# Patient Record
Sex: FEMALE | Race: WHITE | Employment: STUDENT | URBAN - METROPOLITAN AREA
[De-identification: names, ages, dates, MRNs, and addresses within clinical notes are randomized per-mention and may not be internally consistent; named-entity substitution may affect disease eponyms.]

---
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
## 2023-03-09 ENCOUNTER — TELEPHONE (OUTPATIENT)
Dept: FAMILY MEDICINE CLINIC | Age: 18
End: 2023-03-09

## 2023-03-09 NOTE — TELEPHONE ENCOUNTER
Spoke with Sara Ramirez - She's going to check with Jose Miller to see if it was completed. Sulaiman Blanco will be back in tomorrow morning to sign.

## 2023-03-09 NOTE — TELEPHONE ENCOUNTER
I left the form on Julissa's desk. Was waiting to discuss if eye test was done. That's all the was needed other than Jennifer's signature.

## 2023-03-09 NOTE — TELEPHONE ENCOUNTER
----- Message from [facility] sent at 3/8/2023  4:06 PM EST -----  Subject: Message to Provider    QUESTIONS  Information for Provider? Pt dropped of a form for her physical that she   needed filled out yesterday around 1:30. She would like to know if this   has been filled out and when she can pick it up. Call pt back about form. ---------------------------------------------------------------------------  --------------  Miryam FELIZ  [phone]; OK to leave message on voicemail  ---------------------------------------------------------------------------  --------------  SCRIPT ANSWERS  Relationship to Patient?  Self

## 2023-05-11 ENCOUNTER — OFFICE VISIT (OUTPATIENT)
Dept: FAMILY MEDICINE CLINIC | Age: 18
End: 2023-05-11
Payer: COMMERCIAL

## 2023-05-11 VITALS
HEART RATE: 96 BPM | DIASTOLIC BLOOD PRESSURE: 86 MMHG | OXYGEN SATURATION: 98 % | TEMPERATURE: 97.7 F | HEIGHT: 64 IN | SYSTOLIC BLOOD PRESSURE: 128 MMHG | BODY MASS INDEX: 35.51 KG/M2 | WEIGHT: 208 LBS

## 2023-05-11 DIAGNOSIS — H66.002 NON-RECURRENT ACUTE SUPPURATIVE OTITIS MEDIA OF LEFT EAR WITHOUT SPONTANEOUS RUPTURE OF TYMPANIC MEMBRANE: Primary | ICD-10-CM

## 2023-05-11 PROCEDURE — 99213 OFFICE O/P EST LOW 20 MIN: CPT | Performed by: NURSE PRACTITIONER

## 2023-05-11 RX ORDER — AMOXICILLIN AND CLAVULANATE POTASSIUM 875; 125 MG/1; MG/1
1 TABLET, FILM COATED ORAL 2 TIMES DAILY
Qty: 20 TABLET | Refills: 0 | Status: SHIPPED | OUTPATIENT
Start: 2023-05-11 | End: 2023-05-21

## 2023-05-11 RX ORDER — GUAIFENESIN, PSEUDOEPHEDRINE HYDROCHLORIDE 600; 60 MG/1; MG/1
1 TABLET, EXTENDED RELEASE ORAL 2 TIMES DAILY
Qty: 28 TABLET | Refills: 0 | Status: SHIPPED | OUTPATIENT
Start: 2023-05-11 | End: 2023-05-25

## 2023-05-11 RX ORDER — FLUTICASONE PROPIONATE 50 MCG
1 SPRAY, SUSPENSION (ML) NASAL 2 TIMES DAILY
Qty: 32 G | Refills: 1 | Status: SHIPPED | OUTPATIENT
Start: 2023-05-11 | End: 2023-05-25

## 2023-05-11 SDOH — ECONOMIC STABILITY: INCOME INSECURITY: HOW HARD IS IT FOR YOU TO PAY FOR THE VERY BASICS LIKE FOOD, HOUSING, MEDICAL CARE, AND HEATING?: NOT HARD AT ALL

## 2023-05-11 SDOH — ECONOMIC STABILITY: HOUSING INSECURITY
IN THE LAST 12 MONTHS, WAS THERE A TIME WHEN YOU DID NOT HAVE A STEADY PLACE TO SLEEP OR SLEPT IN A SHELTER (INCLUDING NOW)?: NO

## 2023-05-11 SDOH — ECONOMIC STABILITY: FOOD INSECURITY: WITHIN THE PAST 12 MONTHS, THE FOOD YOU BOUGHT JUST DIDN'T LAST AND YOU DIDN'T HAVE MONEY TO GET MORE.: NEVER TRUE

## 2023-05-11 SDOH — ECONOMIC STABILITY: FOOD INSECURITY: WITHIN THE PAST 12 MONTHS, YOU WORRIED THAT YOUR FOOD WOULD RUN OUT BEFORE YOU GOT MONEY TO BUY MORE.: NEVER TRUE

## 2023-05-11 ASSESSMENT — ENCOUNTER SYMPTOMS
CONSTIPATION: 0
EYE PAIN: 0
SINUS PRESSURE: 0
EYE DISCHARGE: 0
RHINORRHEA: 1
VOICE CHANGE: 0
EYE REDNESS: 0
PHOTOPHOBIA: 0
ABDOMINAL PAIN: 0
SORE THROAT: 1
WHEEZING: 0
CHEST TIGHTNESS: 0
DIARRHEA: 0
NAUSEA: 0
EYE ITCHING: 0
COUGH: 1
SINUS PAIN: 0
TROUBLE SWALLOWING: 0
SHORTNESS OF BREATH: 1
VOMITING: 0

## 2023-05-11 ASSESSMENT — PATIENT HEALTH QUESTIONNAIRE - PHQ9
SUM OF ALL RESPONSES TO PHQ QUESTIONS 1-9: 0
SUM OF ALL RESPONSES TO PHQ QUESTIONS 1-9: 0
2. FEELING DOWN, DEPRESSED OR HOPELESS: 0
SUM OF ALL RESPONSES TO PHQ QUESTIONS 1-9: 0
SUM OF ALL RESPONSES TO PHQ9 QUESTIONS 1 & 2: 0
1. LITTLE INTEREST OR PLEASURE IN DOING THINGS: 0
SUM OF ALL RESPONSES TO PHQ QUESTIONS 1-9: 0

## 2023-05-11 ASSESSMENT — VISUAL ACUITY: OU: 1

## 2023-05-11 NOTE — PROGRESS NOTES
Subjective:      Patient ID: Raiza Ferris is a 21 y.o. female who present today with:      Chief Complaint   Patient presents with    URI     Scratchy throat, congestion , headaches start 5/9 Tuesday night symptoms started  Throat was scratchy  Uncomfortable to sleep  Voice is hoarse  Cough throughout the night  No fever, chills, body aches  +congestion  +left ear pain  Little drainage  Nose was bleeding a little  Little SOB  No wheezing  Cough, productive  No GI symptoms  Nothing for this  Friend was sick, covid negative    History reviewed. No pertinent past medical history. Patient Active Problem List    Diagnosis Date Noted    Sprain of left knee 01/28/2020     Past Surgical History:   Procedure Laterality Date    HERNIA REPAIR      Age: 2     Social History     Socioeconomic History    Marital status: Single     Spouse name: None    Number of children: None    Years of education: None    Highest education level: None   Tobacco Use    Smoking status: Never    Smokeless tobacco: Never   Substance and Sexual Activity    Alcohol use: No    Drug use: No    Sexual activity: Never     Social Determinants of Health     Financial Resource Strain: Low Risk     Difficulty of Paying Living Expenses: Not hard at all   Food Insecurity: No Food Insecurity    Worried About Running Out of Food in the Last Year: Never true    Ran Out of Food in the Last Year: Never true   Transportation Needs: Unknown    Lack of Transportation (Non-Medical): No   Housing Stability: Unknown    Unstable Housing in the Last Year: No     Current Outpatient Medications on File Prior to Visit   Medication Sig Dispense Refill    AUROVELA FE 1/20 1-20 MG-MCG per tablet TAKE 1 TABLET BY MOUTH DAILY (Patient not taking: Reported on 5/11/2023) 28 tablet 3    tolnaftate (LOTRIMIN AF) 1 % powder Apply topically 2 times daily.  (Patient not taking: Reported on 5/11/2023) 90 g 1    aluminum chloride (DRYSOL) 20 % external solution Apply topically

## 2023-08-25 ENCOUNTER — OFFICE VISIT (OUTPATIENT)
Dept: FAMILY MEDICINE CLINIC | Age: 18
End: 2023-08-25
Payer: COMMERCIAL

## 2023-08-25 VITALS
BODY MASS INDEX: 35.68 KG/M2 | HEIGHT: 64 IN | TEMPERATURE: 98 F | HEART RATE: 81 BPM | WEIGHT: 209 LBS | DIASTOLIC BLOOD PRESSURE: 64 MMHG | OXYGEN SATURATION: 97 % | SYSTOLIC BLOOD PRESSURE: 122 MMHG

## 2023-08-25 DIAGNOSIS — Z32.01 POSITIVE URINE PREGNANCY TEST: ICD-10-CM

## 2023-08-25 DIAGNOSIS — Z32.01 POSITIVE URINE PREGNANCY TEST: Primary | ICD-10-CM

## 2023-08-25 DIAGNOSIS — E04.9 ENLARGED THYROID: ICD-10-CM

## 2023-08-25 LAB
GONADOTROPIN, CHORIONIC (HCG) QUANT: NORMAL MIU/ML
T4 FREE SERPL-MCNC: 1.51 NG/DL (ref 0.84–1.68)
TSH SERPL-MCNC: 0.91 UIU/ML (ref 0.44–3.86)

## 2023-08-25 PROCEDURE — 99213 OFFICE O/P EST LOW 20 MIN: CPT | Performed by: NURSE PRACTITIONER

## 2023-08-25 ASSESSMENT — ENCOUNTER SYMPTOMS
WHEEZING: 0
VISUAL CHANGE: 0
SHORTNESS OF BREATH: 0
CONSTIPATION: 0
TROUBLE SWALLOWING: 0
ABDOMINAL PAIN: 0
NAUSEA: 1
DIARRHEA: 0
VOMITING: 0
SORE THROAT: 0
HAIR LOSS: 0
CHEST TIGHTNESS: 0
BLOOD IN STOOL: 0
HOARSE VOICE: 0

## 2023-08-28 ENCOUNTER — HOSPITAL ENCOUNTER (OUTPATIENT)
Dept: ULTRASOUND IMAGING | Age: 18
Discharge: HOME | End: 2023-08-30
Payer: COMMERCIAL

## 2023-08-28 DIAGNOSIS — E04.9 ENLARGED THYROID: ICD-10-CM

## 2023-08-28 PROCEDURE — 76536 US EXAM OF HEAD AND NECK: CPT

## 2023-09-05 ENCOUNTER — OFFICE VISIT (OUTPATIENT)
Dept: OBGYN CLINIC | Age: 18
End: 2023-09-05
Payer: COMMERCIAL

## 2023-09-05 VITALS
WEIGHT: 210 LBS | BODY MASS INDEX: 34.99 KG/M2 | SYSTOLIC BLOOD PRESSURE: 122 MMHG | HEIGHT: 65 IN | DIASTOLIC BLOOD PRESSURE: 84 MMHG

## 2023-09-05 DIAGNOSIS — N91.1 SECONDARY AMENORRHEA: Primary | ICD-10-CM

## 2023-09-05 DIAGNOSIS — Z32.01 POSITIVE URINE PREGNANCY TEST: ICD-10-CM

## 2023-09-05 DIAGNOSIS — N91.1 SECONDARY AMENORRHEA: ICD-10-CM

## 2023-09-05 LAB
BACTERIA URNS QL MICRO: ABNORMAL /HPF
BILIRUB UR QL STRIP: NEGATIVE
CLARITY UR: ABNORMAL
COLOR UR: YELLOW
EPI CELLS #/AREA URNS AUTO: ABNORMAL /HPF (ref 0–5)
GLUCOSE UR STRIP-MCNC: NEGATIVE MG/DL
HGB UR QL STRIP: NEGATIVE
HYALINE CASTS #/AREA URNS AUTO: ABNORMAL /HPF (ref 0–5)
HYALINE CASTS #/AREA URNS LPF: ABNORMAL /LPF (ref 0–5)
KETONES UR STRIP-MCNC: ABNORMAL MG/DL
LEUKOCYTE ESTERASE UR QL STRIP: ABNORMAL
NITRITE UR QL STRIP: NEGATIVE
PH UR STRIP: 5.5 [PH] (ref 5–9)
PROT UR STRIP-MCNC: ABNORMAL MG/DL
RBC #/AREA URNS AUTO: ABNORMAL /HPF (ref 0–5)
SP GR UR STRIP: 1.03 (ref 1–1.03)
UROBILINOGEN UR STRIP-ACNC: 0.2 E.U./DL
WBC #/AREA URNS AUTO: ABNORMAL /HPF (ref 0–5)

## 2023-09-05 PROCEDURE — 99204 OFFICE O/P NEW MOD 45 MIN: CPT | Performed by: OBSTETRICS & GYNECOLOGY

## 2023-09-05 PROCEDURE — 81025 URINE PREGNANCY TEST: CPT | Performed by: OBSTETRICS & GYNECOLOGY

## 2023-09-05 RX ORDER — ONDANSETRON 4 MG/1
4 TABLET, ORALLY DISINTEGRATING ORAL 3 TIMES DAILY PRN
Qty: 21 TABLET | Refills: 3 | Status: SHIPPED | OUTPATIENT
Start: 2023-09-05

## 2023-09-05 ASSESSMENT — ENCOUNTER SYMPTOMS
NAUSEA: 1
APNEA: 0
ABDOMINAL PAIN: 0
SHORTNESS OF BREATH: 0

## 2023-09-06 LAB
AMPHET CTO UR CFM-MCNC: NEGATIVE NG/ML
ANNOTATION COMMENT IMP: NORMAL
BARBITURATES CTO UR CFM-MCNC: NEGATIVE NG/ML
BENZODIAZ CTO UR CFM-MCNC: NEGATIVE NG/ML
CANNABINOIDS CTO UR CFM-MCNC: NEGATIVE NG/ML
CLUE CELLS VAG QL WET PREP: ABNORMAL
COCAINE CTO UR CFM-MCNC: NEGATIVE NG/ML
CREAT UR-MCNC: 237.8 MG/DL (ref 20–400)
ETHANOL UR CFM-MCNC: NEGATIVE MG/DL
METHADONE CTO UR CFM-MCNC: NEGATIVE NG/ML
OPIATES CTO UR CFM-MCNC: NEGATIVE NG/ML
PCP CTO UR CFM-MCNC: NEGATIVE NG/ML
PROPOXYPH CTO UR CFM-MCNC: NEGATIVE NG/ML
T VAGINALIS VAG QL WET PREP: ABNORMAL
TRICHOMONAS VAGINALIS SCREEN: NEGATIVE
YEAST VAG QL WET PREP: ABNORMAL

## 2023-09-06 NOTE — PROGRESS NOTES
Chaperone for Intimate Exam  Chaperone was offered and accepted as part of the rooming process.   Chaperone: med student
C.trachomatis N.gonorrhoeae DNA    HIV Screen    Hepatitis C Antibody    PAP SMEAR    US OB LESS THAN 14 WEEKS SINGLE OR FIRST GESTATION    US OB TRANSVAGINAL    Comprehensive Metabolic Panel    HCG, Quantitative, Pregnancy      2. Positive urine pregnancy test  Wet prep, genital    Varicella Zoster Antibody, IgG    Urinalysis    Type and screen    Rubella antibody, IgG    RPR    POC Pregnancy Urine Qual    Herpes Simplex Virus (HSV) II Glycoprotein Antibody IgG    Herpes Simplex Virus (HSV) I Glycoprotein Antibody IgG    Hepatitis B Surface Antigen    Drug Panel 9A Screen, Urine    Culture, Urine    CBC with Auto Differential    C.trachomatis N.gonorrhoeae DNA    HIV Screen    Hepatitis C Antibody    PAP SMEAR    US OB LESS THAN 14 WEEKS SINGLE OR FIRST GESTATION    US OB TRANSVAGINAL    Comprehensive Metabolic Panel    HCG, Quantitative, Pregnancy            Plan:      Orders Placed This Encounter   Procedures    Wet prep, genital     Standing Status:   Future     Number of Occurrences:   1     Standing Expiration Date:   9/5/2024    Culture, Urine     Standing Status:   Future     Number of Occurrences:   1     Standing Expiration Date:   9/5/2024     Order Specific Question:   Specify (ex-cath, midstream, cysto, etc)? Answer:   midstream    C.trachomatis N.gonorrhoeae DNA     Standing Status:   Future     Number of Occurrences:   1     Standing Expiration Date:   9/5/2024    US OB LESS THAN 14 WEEKS SINGLE OR FIRST GESTATION     This procedure can be scheduled via [facility]. Access your [facility] account by visiting [email or web address].      Standing Status:   Future     Standing Expiration Date:   9/5/2024    US OB TRANSVAGINAL     Standing Status:   Future     Standing Expiration Date:   9/4/2024    Varicella Zoster Antibody, IgG     Standing Status:   Future     Standing Expiration Date:   9/5/2024    Urinalysis     Standing Status:   Future     Number of Occurrences:   1     Standing Expiration Date:   9/5/2024

## 2023-09-07 LAB
BACTERIA UR CULT: ABNORMAL
BACTERIA UR CULT: ABNORMAL
ORGANISM: ABNORMAL

## 2023-09-08 ENCOUNTER — HOSPITAL ENCOUNTER (OUTPATIENT)
Dept: ULTRASOUND IMAGING | Age: 18
End: 2023-09-08
Payer: COMMERCIAL

## 2023-09-08 DIAGNOSIS — Z32.01 POSITIVE URINE PREGNANCY TEST: ICD-10-CM

## 2023-09-08 DIAGNOSIS — N91.1 SECONDARY AMENORRHEA: ICD-10-CM

## 2023-09-08 LAB
C TRACH DNA CVX QL NAA+PROBE: NEGATIVE
N GONORRHOEA DNA CERV MUCUS QL NAA+PROBE: NEGATIVE

## 2023-09-08 PROCEDURE — 76817 TRANSVAGINAL US OBSTETRIC: CPT

## 2023-09-08 PROCEDURE — 76801 OB US < 14 WKS SINGLE FETUS: CPT

## 2023-09-10 RX ORDER — AMOXICILLIN 500 MG/1
500 CAPSULE ORAL 2 TIMES DAILY
Qty: 20 CAPSULE | Refills: 0 | Status: SHIPPED | OUTPATIENT
Start: 2023-09-10 | End: 2023-09-20

## 2023-09-28 DIAGNOSIS — Z32.01 POSITIVE URINE PREGNANCY TEST: ICD-10-CM

## 2023-09-28 DIAGNOSIS — N91.1 SECONDARY AMENORRHEA: ICD-10-CM

## 2023-09-28 LAB
ALBUMIN SERPL-MCNC: 4.6 G/DL (ref 3.5–4.6)
ALP SERPL-CCNC: 88 U/L (ref 40–130)
ALT SERPL-CCNC: 12 U/L (ref 0–33)
ANION GAP SERPL CALCULATED.3IONS-SCNC: 13 MEQ/L (ref 9–15)
AST SERPL-CCNC: 14 U/L (ref 0–35)
BASOPHILS # BLD: 0 K/UL (ref 0–0.2)
BASOPHILS NFR BLD: 0.2 %
BILIRUB SERPL-MCNC: <0.2 MG/DL (ref 0.2–0.7)
BUN SERPL-MCNC: 11 MG/DL (ref 6–20)
CALCIUM SERPL-MCNC: 9.2 MG/DL (ref 8.5–9.9)
CHLORIDE SERPL-SCNC: 102 MEQ/L (ref 95–107)
CO2 SERPL-SCNC: 21 MEQ/L (ref 20–31)
CREAT SERPL-MCNC: 0.66 MG/DL (ref 0.5–0.9)
EOSINOPHIL # BLD: 0.1 K/UL (ref 0–0.7)
EOSINOPHIL NFR BLD: 1.1 %
ERYTHROCYTE [DISTWIDTH] IN BLOOD BY AUTOMATED COUNT: 12.8 % (ref 11.5–14.5)
GLOBULIN SER CALC-MCNC: 2.5 G/DL (ref 2.3–3.5)
GLUCOSE SERPL-MCNC: 73 MG/DL (ref 70–99)
GONADOTROPIN, CHORIONIC (HCG) QUANT: NORMAL MIU/ML
HBV SURFACE AG SERPL QL IA: NORMAL
HCT VFR BLD AUTO: 36.5 % (ref 37–47)
HGB BLD-MCNC: 11.9 G/DL (ref 12–16)
LYMPHOCYTES # BLD: 2.1 K/UL (ref 1–4.8)
LYMPHOCYTES NFR BLD: 23.4 %
MCH RBC QN AUTO: 29.3 PG (ref 27–31.3)
MCHC RBC AUTO-ENTMCNC: 32.6 % (ref 33–37)
MCV RBC AUTO: 89.9 FL (ref 79.4–94.8)
MONOCYTES # BLD: 0.5 K/UL (ref 0.2–0.8)
MONOCYTES NFR BLD: 5 %
NEUTROPHILS # BLD: 6.2 K/UL (ref 1.4–6.5)
NEUTS SEG NFR BLD: 68.5 %
PLATELET # BLD AUTO: 305 K/UL (ref 130–400)
POTASSIUM SERPL-SCNC: 3.6 MEQ/L (ref 3.4–4.9)
PROT SERPL-MCNC: 7.1 G/DL (ref 6.3–8)
RBC # BLD AUTO: 4.06 M/UL (ref 4.2–5.4)
RUBELLA ANTIBODY IGG: 146.7 IU/ML
SODIUM SERPL-SCNC: 136 MEQ/L (ref 135–144)
WBC # BLD AUTO: 9 K/UL (ref 4.5–11)

## 2023-09-29 LAB
ABO + RH BLD: NORMAL
BLD GP AB SCN SERPL QL: NORMAL
HEPATITIS C ANTIBODY: NONREACTIVE
HIV AG/AB: NONREACTIVE
RPR SER QL: NORMAL

## 2023-10-03 ENCOUNTER — ROUTINE PRENATAL (OUTPATIENT)
Dept: OBGYN CLINIC | Age: 18
End: 2023-10-03

## 2023-10-03 VITALS
SYSTOLIC BLOOD PRESSURE: 110 MMHG | BODY MASS INDEX: 35.32 KG/M2 | HEART RATE: 93 BPM | WEIGHT: 209 LBS | DIASTOLIC BLOOD PRESSURE: 78 MMHG

## 2023-10-03 DIAGNOSIS — Z31.430 ENCOUNTER OF FEMALE FOR TESTING FOR GENETIC DISEASE CARRIER STATUS FOR PROCREATIVE MANAGEMENT: ICD-10-CM

## 2023-10-03 DIAGNOSIS — Z34.82 PRENATAL CARE, SUBSEQUENT PREGNANCY IN SECOND TRIMESTER: Primary | ICD-10-CM

## 2023-10-03 NOTE — PROGRESS NOTES
counseled on termination of pregnancy in the state of [place]. The patient was counseled on Toxoplasmosis, HIV, Tobacco Abuse, Group Beta Strep Infections, Cystic Fibrosis,  Labor precautions and Sickle Cell disease. The patient was counseled on the risks of tobacco abuse. Both maternal and fetal. She was instructed to stop smoking if currently using tobacco. Morbidity, mortality, and cessation programs were reviewed. The risks include but are not limited to increased risks of  labor,  delivery, premature rupture of membranes, intrauterine growth restriction, intrauterine fetal demise and abruptio placenta. Secondary smoke risks were also reviewed. Increases in cancer, respiratory problems, and sudden infant death syndrome were reviewed as well. The patient was informed of a 2-4% risk of congenital anomalies in the general population. She was also informed that karyotyping is the only way to evaluate the fetus for genetic problems and genetic lethal anomalies. Chorionic villous sampling, amniocentesis and Maternal Genetic Blood Sampling-(NIPT Testing) were also discussed with morbidity rates in detail. She requested any of the options. Route of delivery and counseling on vaginal, operative vaginal, and  sections were completed with the risks of each to both the patient as well as her baby. The possibility of a blood transfusion was discussed as well. The patient was not opposed to receiving a transfusion if needed. First trimester screening and MSAFP single marker testing was reviewed in detail with attention to timing of testing and their windows. A second trimester amniocentesis with MFM consults is also made available to the patient with significant risks. Risks, Benefits and non-invasive alternative testing was reviewed.      The patient was questioned in detail regarding any genetic misnomer history, chromosomal abnormalities, or learning disabilities in  herself, the

## 2023-10-06 LAB
HSV1 GG IGG SER-ACNC: 0.12 IV
HSV2 GG IGG SER-ACNC: 0.07 IV
VZV IGG SER IA-ACNC: 180.4 IV

## 2023-10-20 ENCOUNTER — TELEPHONE (OUTPATIENT)
Dept: OBGYN CLINIC | Age: 18
End: 2023-10-20

## 2023-10-20 NOTE — TELEPHONE ENCOUNTER
Andrae Segal from [place] [employer] called to let the provider know that the patients insurance denied the coverage for the [employer]

## 2023-10-23 ENCOUNTER — PATIENT MESSAGE (OUTPATIENT)
Dept: OBGYN CLINIC | Age: 18
End: 2023-10-23

## 2023-11-03 ENCOUNTER — ROUTINE PRENATAL (OUTPATIENT)
Dept: OBGYN CLINIC | Age: 18
End: 2023-11-03

## 2023-11-03 VITALS — DIASTOLIC BLOOD PRESSURE: 62 MMHG | SYSTOLIC BLOOD PRESSURE: 110 MMHG

## 2023-11-03 DIAGNOSIS — Z34.00 SUPERVISION OF NORMAL FIRST PREGNANCY, ANTEPARTUM: Primary | ICD-10-CM

## 2023-11-03 DIAGNOSIS — Z3A.17 17 WEEKS GESTATION OF PREGNANCY: ICD-10-CM

## 2023-11-03 DIAGNOSIS — Z34.00 SUPERVISION OF NORMAL FIRST PREGNANCY, ANTEPARTUM: ICD-10-CM

## 2023-11-03 LAB
ALBUMIN SERPL-MCNC: 3.9 G/DL (ref 3.5–4.6)
ALP SERPL-CCNC: 82 U/L (ref 40–130)
ALT SERPL-CCNC: 11 U/L (ref 0–33)
ANION GAP SERPL CALCULATED.3IONS-SCNC: 13 MEQ/L (ref 9–15)
AST SERPL-CCNC: 15 U/L (ref 0–35)
BASOPHILS # BLD: 0 K/UL (ref 0–0.2)
BASOPHILS NFR BLD: 0.2 %
BILIRUB SERPL-MCNC: 0.3 MG/DL (ref 0.2–0.7)
BUN SERPL-MCNC: 7 MG/DL (ref 6–20)
CALCIUM SERPL-MCNC: 9.4 MG/DL (ref 8.5–9.9)
CHLORIDE SERPL-SCNC: 104 MEQ/L (ref 95–107)
CO2 SERPL-SCNC: 22 MEQ/L (ref 20–31)
CREAT SERPL-MCNC: 0.6 MG/DL (ref 0.5–0.9)
EOSINOPHIL # BLD: 0.1 K/UL (ref 0–0.7)
EOSINOPHIL NFR BLD: 1.4 %
ERYTHROCYTE [DISTWIDTH] IN BLOOD BY AUTOMATED COUNT: 13.1 % (ref 11.5–14.5)
GLOBULIN SER CALC-MCNC: 2.8 G/DL (ref 2.3–3.5)
GLUCOSE SERPL-MCNC: 64 MG/DL (ref 70–99)
HCT VFR BLD AUTO: 35.2 % (ref 37–47)
HGB BLD-MCNC: 11.4 G/DL (ref 12–16)
LYMPHOCYTES # BLD: 1.8 K/UL (ref 1–4.8)
LYMPHOCYTES NFR BLD: 20.8 %
MCH RBC QN AUTO: 29.4 PG (ref 27–31.3)
MCHC RBC AUTO-ENTMCNC: 32.4 % (ref 33–37)
MCV RBC AUTO: 90.7 FL (ref 79.4–94.8)
MONOCYTES # BLD: 0.4 K/UL (ref 0.2–0.8)
MONOCYTES NFR BLD: 4.6 %
NEUTROPHILS # BLD: 6.2 K/UL (ref 1.4–6.5)
NEUTS SEG NFR BLD: 72.6 %
PLATELET # BLD AUTO: 268 K/UL (ref 130–400)
POTASSIUM SERPL-SCNC: 4.4 MEQ/L (ref 3.4–4.9)
PROT SERPL-MCNC: 6.7 G/DL (ref 6.3–8)
RBC # BLD AUTO: 3.88 M/UL (ref 4.2–5.4)
SODIUM SERPL-SCNC: 139 MEQ/L (ref 135–144)
T4 FREE SERPL-MCNC: 1.43 NG/DL (ref 0.84–1.68)
TSH SERPL-MCNC: 1.27 UIU/ML (ref 0.44–3.86)
WBC # BLD AUTO: 8.5 K/UL (ref 4.5–11)

## 2023-11-10 ENCOUNTER — HOSPITAL ENCOUNTER (OUTPATIENT)
Dept: ULTRASOUND IMAGING | Age: 18
Discharge: HOME | End: 2023-11-10
Attending: OBSTETRICS & GYNECOLOGY
Payer: COMMERCIAL

## 2023-11-10 DIAGNOSIS — Z34.00 SUPERVISION OF NORMAL FIRST PREGNANCY, ANTEPARTUM: ICD-10-CM

## 2023-11-10 DIAGNOSIS — Z3A.17 17 WEEKS GESTATION OF PREGNANCY: ICD-10-CM

## 2023-11-10 LAB
ABDOMINAL CIRCUMFERENCE: 12.88 CM
ABDOMINAL CIRCUMFERENCE: 13.12 CM
ABDOMINAL CIRCUMFERENCE: 13.23 CM
ABDOMINAL CIRCUMFERENCE: 13.24 CM
BIPARIETAL DIAMETER: 4.19 CM
BIPARIETAL DIAMETER: 4.23 CM
BIPARIETAL DIAMETER: 4.28 CM
BIPARIETAL DIAMETER: 4.42 CM
HEAD CIRCUMFERENCE: 15.19 CM
HEAD CIRCUMFERENCE: 15.5 CM
HEAD CIRCUMFERENCE: 15.51 CM
HEAD CIRCUMFERENCE: 15.8 CM

## 2023-11-10 PROCEDURE — 76805 OB US >/= 14 WKS SNGL FETUS: CPT

## 2023-12-01 ENCOUNTER — ROUTINE PRENATAL (OUTPATIENT)
Dept: OBGYN CLINIC | Age: 18
End: 2023-12-01

## 2023-12-01 VITALS — BODY MASS INDEX: 36.5 KG/M2 | SYSTOLIC BLOOD PRESSURE: 122 MMHG | WEIGHT: 216 LBS | DIASTOLIC BLOOD PRESSURE: 74 MMHG

## 2023-12-01 DIAGNOSIS — Z34.00 ENCOUNTER FOR SUPERVISION PREGNANCY IN PRIMIGRAVIDA, ANTEPARTUM: ICD-10-CM

## 2023-12-01 DIAGNOSIS — Z3A.21 21 WEEKS GESTATION OF PREGNANCY: Primary | ICD-10-CM

## 2024-01-03 ENCOUNTER — OFFICE VISIT (OUTPATIENT)
Dept: FAMILY MEDICINE CLINIC | Age: 19
End: 2024-01-03
Payer: MEDICAID

## 2024-01-03 VITALS
RESPIRATION RATE: 16 BRPM | BODY MASS INDEX: 31.55 KG/M2 | HEIGHT: 64 IN | DIASTOLIC BLOOD PRESSURE: 76 MMHG | HEART RATE: 112 BPM | TEMPERATURE: 98.5 F | OXYGEN SATURATION: 95 % | WEIGHT: 184.8 LBS | SYSTOLIC BLOOD PRESSURE: 126 MMHG

## 2024-01-03 DIAGNOSIS — H92.03 OTALGIA OF BOTH EARS: Primary | ICD-10-CM

## 2024-01-03 DIAGNOSIS — R09.81 NASAL CONGESTION: ICD-10-CM

## 2024-01-03 PROCEDURE — 99213 OFFICE O/P EST LOW 20 MIN: CPT | Performed by: NURSE PRACTITIONER

## 2024-01-03 RX ORDER — NEOMYCIN SULFATE, POLYMYXIN B SULFATE AND HYDROCORTISONE 10; 3.5; 1 MG/ML; MG/ML; [USP'U]/ML
4 SUSPENSION/ DROPS AURICULAR (OTIC) 3 TIMES DAILY PRN
Qty: 10 ML | Refills: 0 | Status: SHIPPED | OUTPATIENT
Start: 2024-01-03 | End: 2024-01-13

## 2024-01-03 RX ORDER — ACETAMINOPHEN 500 MG
1000 TABLET ORAL EVERY 6 HOURS PRN
Qty: 30 TABLET | Refills: 3 | Status: SHIPPED | OUTPATIENT
Start: 2024-01-03

## 2024-01-03 ASSESSMENT — PATIENT HEALTH QUESTIONNAIRE - PHQ9
SUM OF ALL RESPONSES TO PHQ9 QUESTIONS 1 & 2: 0
2. FEELING DOWN, DEPRESSED OR HOPELESS: 0
SUM OF ALL RESPONSES TO PHQ QUESTIONS 1-9: 0
SUM OF ALL RESPONSES TO PHQ QUESTIONS 1-9: 0
1. LITTLE INTEREST OR PLEASURE IN DOING THINGS: 0
SUM OF ALL RESPONSES TO PHQ QUESTIONS 1-9: 0
SUM OF ALL RESPONSES TO PHQ QUESTIONS 1-9: 0

## 2024-01-03 NOTE — PROGRESS NOTES
Subjective  Rose Mary Momin, 18 y.o. female presents today with:  Chief Complaint   Patient presents with    Otalgia     Both ears have  pain but left is worse x5 days       HPI  Presents to  for otalgia   Bilateral ear pain over the last 5 days. Occurring off/on.   C/o nasal congestion   Has Flonase from May. Not currently using. Advised to restart for 2-3 weeks.   Eating and drinking   Denies headache  Denies feeling dizzy or lightheaded   Denies chills                     No past medical history on file.   Past Surgical History:   Procedure Laterality Date    HERNIA REPAIR      Age: 2     No family history on file.        Review of Systems   Constitutional:  Negative for activity change, appetite change, chills and fever.   HENT:  Positive for congestion, ear pain and postnasal drip. Negative for sinus pressure and sore throat.    Respiratory:  Negative for cough.    Gastrointestinal:  Negative for diarrhea and vomiting.   Musculoskeletal:  Negative for neck stiffness.   Neurological:  Negative for dizziness, light-headedness and headaches.         PMH, Surgical Hx, Family Hx, and Social Hx reviewed and updated.  Health Maintenance reviewed.    Objective  Vitals:    01/03/24 1617   BP: 126/76   Site: Right Upper Arm   Position: Sitting   Cuff Size: Large Adult   Pulse: (!) 112   Resp: 16   Temp: 98.5 °F (36.9 °C)   SpO2: 95%   Weight: 83.8 kg (184 lb 12.8 oz)   Height: 1.626 m (5' 4\")     BP Readings from Last 3 Encounters:   01/03/24 126/76   12/01/23 122/74   11/03/23 110/62     Wt Readings from Last 3 Encounters:   01/03/24 83.8 kg (184 lb 12.8 oz)   12/01/23 98 kg (216 lb) (98 %, Z= 2.16)*   10/03/23 94.8 kg (209 lb) (98 %, Z= 2.07)*     * Growth percentiles are based on CDC (Girls, 2-20 Years) data.         Physical Exam  Vitals reviewed.   Constitutional:       General: She is awake. She is not in acute distress.     Appearance: Normal appearance.   HENT:      Right Ear: Tympanic membrane, ear

## 2024-01-04 ASSESSMENT — ENCOUNTER SYMPTOMS
COUGH: 0
SINUS PRESSURE: 0
VOMITING: 0
SORE THROAT: 0
DIARRHEA: 0

## 2024-01-06 DIAGNOSIS — Z3A.21 21 WEEKS GESTATION OF PREGNANCY: ICD-10-CM

## 2024-01-06 DIAGNOSIS — Z34.00 ENCOUNTER FOR SUPERVISION PREGNANCY IN PRIMIGRAVIDA, ANTEPARTUM: ICD-10-CM

## 2024-01-06 LAB
BASOPHILS # BLD: 0 K/UL (ref 0–0.2)
BASOPHILS NFR BLD: 0.3 %
EOSINOPHIL # BLD: 0.3 K/UL (ref 0–0.7)
EOSINOPHIL NFR BLD: 2.6 %
ERYTHROCYTE [DISTWIDTH] IN BLOOD BY AUTOMATED COUNT: 13.3 % (ref 11.5–14.5)
GLUCOSE, 1HR PP: 174 MG/DL (ref 60–140)
HCT VFR BLD AUTO: 34 % (ref 37–47)
HGB BLD-MCNC: 10.8 G/DL (ref 12–16)
LYMPHOCYTES # BLD: 1.9 K/UL (ref 1–4.8)
LYMPHOCYTES NFR BLD: 18.7 %
MCH RBC QN AUTO: 29 PG (ref 27–31.3)
MCHC RBC AUTO-ENTMCNC: 31.8 % (ref 33–37)
MCV RBC AUTO: 91.2 FL (ref 79.4–94.8)
MONOCYTES # BLD: 0.3 K/UL (ref 0.2–0.8)
MONOCYTES NFR BLD: 3.3 %
NEUTROPHILS # BLD: 7.4 K/UL (ref 1.4–6.5)
NEUTS SEG NFR BLD: 73.9 %
PLATELET # BLD AUTO: 271 K/UL (ref 130–400)
RBC # BLD AUTO: 3.73 M/UL (ref 4.2–5.4)
RPR SER QL: NORMAL
WBC # BLD AUTO: 10 K/UL (ref 4.5–11)

## 2024-01-08 ENCOUNTER — ROUTINE PRENATAL (OUTPATIENT)
Dept: OBGYN CLINIC | Age: 19
End: 2024-01-08

## 2024-01-08 VITALS — DIASTOLIC BLOOD PRESSURE: 82 MMHG | WEIGHT: 215 LBS | BODY MASS INDEX: 36.9 KG/M2 | SYSTOLIC BLOOD PRESSURE: 122 MMHG

## 2024-01-08 DIAGNOSIS — Z34.00 SUPERVISION OF NORMAL FIRST PREGNANCY, ANTEPARTUM: Primary | ICD-10-CM

## 2024-01-08 DIAGNOSIS — Z3A.26 26 WEEKS GESTATION OF PREGNANCY: ICD-10-CM

## 2024-01-08 DIAGNOSIS — R73.09 ABNORMAL GTT (GLUCOSE TOLERANCE TEST): ICD-10-CM

## 2024-01-08 NOTE — PROGRESS NOTES
Patient's last menstrual period was 07/05/2023.  Please reference prenatal and OB flow chart for further information  PT here today for routine prenatal care  Pt endorses fetal movement and denies loss of fluid, contractions or vaginal bleeding  Pt without complaints  ROS:  Pt denies headache, dysuria, nausea/vomiting  PT denies chest pain or SOB  PE:  /82   Wt 97.5 kg (215 lb)   LMP 07/05/2023   BMI 36.90 kg/m²   Gen - Alert and oriented x 3  HEENT- NC/AT, CVS - RRR, Lungs - CTAB  Abd - FH Appropriate fetal growth  LE no edema  Reassuring fetal status at this time     Diagnosis Orders   1. Supervision of normal first pregnancy, antepartum  Glucose Tolerance, 3 Hours      2. 26 weeks gestation of pregnancy  Glucose Tolerance, 3 Hours      3. Abnormal GTT (glucose tolerance test)  Glucose Tolerance, 3 Hours          Upon completion of the visit all questions were answered and the patients follow-up and testing schedule were reviewed.  Pt recommended to continue pnvit and iron if ordered along with other supporting meds  Spent 20-25 min total time with pt

## 2024-01-11 ENCOUNTER — NURSE ONLY (OUTPATIENT)
Dept: OBGYN CLINIC | Age: 19
End: 2024-01-11
Payer: MEDICAID

## 2024-01-11 DIAGNOSIS — R73.09 ABNORMAL GTT (GLUCOSE TOLERANCE TEST): ICD-10-CM

## 2024-01-11 DIAGNOSIS — Z34.00 SUPERVISION OF NORMAL FIRST PREGNANCY, ANTEPARTUM: ICD-10-CM

## 2024-01-11 DIAGNOSIS — R73.09 ABNORMAL GTT (GLUCOSE TOLERANCE TEST): Primary | ICD-10-CM

## 2024-01-11 DIAGNOSIS — Z3A.26 26 WEEKS GESTATION OF PREGNANCY: ICD-10-CM

## 2024-01-11 LAB
GLUCOSE 2H P 100 G GLC PO SERPL-MCNC: 126 MG/DL
GLUCOSE 3H P 100 G GLC PO SERPL-MCNC: 125 MG/DL
GLUCOSE BS SERPL-MCNC: 81 MG/DL
GLUCOSE TOLERANCE TEST 1 HOUR: 192 MG/DL

## 2024-01-11 PROCEDURE — 36415 COLL VENOUS BLD VENIPUNCTURE: CPT | Performed by: OBSTETRICS & GYNECOLOGY

## 2024-02-09 ENCOUNTER — ROUTINE PRENATAL (OUTPATIENT)
Dept: OBGYN CLINIC | Age: 19
End: 2024-02-09

## 2024-02-09 VITALS — DIASTOLIC BLOOD PRESSURE: 64 MMHG | SYSTOLIC BLOOD PRESSURE: 110 MMHG | WEIGHT: 222 LBS | BODY MASS INDEX: 38.11 KG/M2

## 2024-02-09 DIAGNOSIS — Z3A.31 31 WEEKS GESTATION OF PREGNANCY: Primary | ICD-10-CM

## 2024-02-09 DIAGNOSIS — Z34.00 ENCOUNTER FOR SUPERVISION PREGNANCY IN PRIMIGRAVIDA, ANTEPARTUM: ICD-10-CM

## 2024-02-09 NOTE — PROGRESS NOTES
Patient's last menstrual period was 07/05/2023.  Please reference prenatal and OB flow chart for further information  PT here today for routine prenatal care  Pt endorses fetal movement and denies loss of fluid, contractions or vaginal bleeding  Pt without complaints  ROS:  Pt denies headache, dysuria, nausea/vomiting  PT denies chest pain or SOB  PE:  /64   Wt 100.7 kg (222 lb)   LMP 07/05/2023   BMI 38.11 kg/m²   Gen - Alert and oriented x 3  HEENT- NC/AT, CVS - RRR, Lungs - CTAB  Abd - FH Appropriate fetal growth  LE no edema  Reassuring fetal status at this time     Diagnosis Orders   1. 31 weeks gestation of pregnancy  US OB 1 OR MORE FETUS LIMITED      2. Encounter for supervision pregnancy in primigravida, antepartum  US OB 1 OR MORE FETUS LIMITED          Upon completion of the visit all questions were answered and the patients follow-up and testing schedule were reviewed.  Pt recommended to continue pnvit and iron if ordered along with other supporting meds  Spent 20-25 min total time with pt

## 2024-02-23 ENCOUNTER — ROUTINE PRENATAL (OUTPATIENT)
Dept: OBGYN CLINIC | Age: 19
End: 2024-02-23

## 2024-02-23 VITALS — BODY MASS INDEX: 38.28 KG/M2 | SYSTOLIC BLOOD PRESSURE: 122 MMHG | DIASTOLIC BLOOD PRESSURE: 84 MMHG | WEIGHT: 223 LBS

## 2024-02-23 DIAGNOSIS — Z3A.33 33 WEEKS GESTATION OF PREGNANCY: Primary | ICD-10-CM

## 2024-02-23 DIAGNOSIS — Z34.00 ENCOUNTER FOR SUPERVISION PREGNANCY IN PRIMIGRAVIDA, ANTEPARTUM: ICD-10-CM

## 2024-02-23 NOTE — PROGRESS NOTES
Patient's last menstrual period was 07/05/2023.  Please reference prenatal and OB flow chart for further information  PT here today for routine prenatal care  Pt endorses fetal movement and denies loss of fluid, contractions or vaginal bleeding  Pt without complaints  ROS:  Pt denies headache, dysuria, nausea/vomiting  PT denies chest pain or SOB  PE:  /84   Wt 101.2 kg (223 lb)   LMP 07/05/2023   BMI 38.28 kg/m²   Gen - Alert and oriented x 3  HEENT- NC/AT, CVS - RRR, Lungs - CTAB  Abd - FH Appropriate fetal growth  LE no edema  Reassuring fetal status at this time     Diagnosis Orders   1. 33 weeks gestation of pregnancy        2. Encounter for supervision pregnancy in primigravida, antepartum            Upon completion of the visit all questions were answered and the patients follow-up and testing schedule were reviewed.  Pt recommended to continue pnvit and iron if ordered along with other supporting meds  Spent 20-25 min total time with pt

## 2024-03-03 ENCOUNTER — PATIENT MESSAGE (OUTPATIENT)
Dept: OBGYN CLINIC | Age: 19
End: 2024-03-03

## 2024-03-04 NOTE — TELEPHONE ENCOUNTER
From: Rose Mary Momin  To: Dr. Elisabet Campo  Sent: 3/3/2024 9:05 PM EST  Subject: 36 week testing    Hi, I just wanted to reach out about my appointment on the 8th. I'll be 35 weeks but I didn't know if I was to try to reschedule my appointment for when I am 36 weeks. As I was told I have to be swabbed. Or just to keep the appointment on the 8th and then schedule another appointment for 36 weeks?   Thank you for you time!

## 2024-03-08 ENCOUNTER — ROUTINE PRENATAL (OUTPATIENT)
Dept: OBGYN CLINIC | Age: 19
End: 2024-03-08

## 2024-03-08 VITALS — SYSTOLIC BLOOD PRESSURE: 108 MMHG | BODY MASS INDEX: 38.45 KG/M2 | WEIGHT: 224 LBS | DIASTOLIC BLOOD PRESSURE: 72 MMHG

## 2024-03-08 DIAGNOSIS — Z3A.35 35 WEEKS GESTATION OF PREGNANCY: Primary | ICD-10-CM

## 2024-03-08 DIAGNOSIS — Z34.00 SUPERVISION OF NORMAL FIRST PREGNANCY, ANTEPARTUM: ICD-10-CM

## 2024-03-08 NOTE — PROGRESS NOTES
Patient's last menstrual period was 07/05/2023.  Please reference prenatal and OB flow chart for further information  PT here today for routine prenatal care  Pt endorses fetal movement and denies loss of fluid, contractions or vaginal bleeding  Pt without complaints  ROS:  Pt denies headache, dysuria, nausea/vomiting  PT denies chest pain or SOB  PE:  /72   Wt 101.6 kg (224 lb)   LMP 07/05/2023   BMI 38.45 kg/m²   Gen - Alert and oriented x 3  HEENT- NC/AT, CVS - RRR, Lungs - CTAB  Abd - FH Appropriate fetal growth  LE no edema  Reassuring fetal status at this time     Diagnosis Orders   1. 35 weeks gestation of pregnancy        2. Supervision of normal first pregnancy, antepartum            Upon completion of the visit all questions were answered and the patients follow-up and testing schedule were reviewed.  Pt recommended to continue pnvit and iron if ordered along with other supporting meds  Spent 20-25 min total time with pt

## 2024-03-12 ENCOUNTER — HOSPITAL ENCOUNTER (OUTPATIENT)
Dept: ULTRASOUND IMAGING | Age: 19
Discharge: HOME | End: 2024-03-14
Payer: MEDICAID

## 2024-03-12 DIAGNOSIS — Z3A.31 31 WEEKS GESTATION OF PREGNANCY: ICD-10-CM

## 2024-03-12 DIAGNOSIS — Z34.00 ENCOUNTER FOR SUPERVISION PREGNANCY IN PRIMIGRAVIDA, ANTEPARTUM: ICD-10-CM

## 2024-03-12 PROCEDURE — 76815 OB US LIMITED FETUS(S): CPT

## 2024-03-15 ENCOUNTER — ROUTINE PRENATAL (OUTPATIENT)
Dept: OBGYN CLINIC | Age: 19
End: 2024-03-15

## 2024-03-15 VITALS — BODY MASS INDEX: 38.11 KG/M2 | SYSTOLIC BLOOD PRESSURE: 108 MMHG | DIASTOLIC BLOOD PRESSURE: 72 MMHG | WEIGHT: 222 LBS

## 2024-03-15 DIAGNOSIS — Z3A.36 36 WEEKS GESTATION OF PREGNANCY: Primary | ICD-10-CM

## 2024-03-15 DIAGNOSIS — Z34.00 ENCOUNTER FOR SUPERVISION PREGNANCY IN PRIMIGRAVIDA, ANTEPARTUM: ICD-10-CM

## 2024-03-15 DIAGNOSIS — Z3A.36 36 WEEKS GESTATION OF PREGNANCY: ICD-10-CM

## 2024-03-15 LAB
CLUE CELLS VAG QL WET PREP: ABNORMAL
T VAGINALIS VAG QL WET PREP: ABNORMAL
TRICHOMONAS VAGINALIS SCREEN: NEGATIVE
YEAST VAG QL WET PREP: ABNORMAL

## 2024-03-19 LAB
C TRACH DNA CVX QL NAA+PROBE: NEGATIVE
GP B STREP SPEC QL CULT: NORMAL
N GONORRHOEA DNA CERV MUCUS QL NAA+PROBE: NEGATIVE

## 2024-03-20 ENCOUNTER — ROUTINE PRENATAL (OUTPATIENT)
Dept: OBGYN CLINIC | Age: 19
End: 2024-03-20
Payer: MEDICAID

## 2024-03-20 VITALS — DIASTOLIC BLOOD PRESSURE: 80 MMHG | BODY MASS INDEX: 38.45 KG/M2 | WEIGHT: 224 LBS | SYSTOLIC BLOOD PRESSURE: 110 MMHG

## 2024-03-20 DIAGNOSIS — Z3A.37 37 WEEKS GESTATION OF PREGNANCY: Primary | ICD-10-CM

## 2024-03-20 DIAGNOSIS — Z34.00 ENCOUNTER FOR SUPERVISION PREGNANCY IN PRIMIGRAVIDA, ANTEPARTUM: ICD-10-CM

## 2024-03-20 PROCEDURE — 99213 OFFICE O/P EST LOW 20 MIN: CPT | Performed by: OBSTETRICS & GYNECOLOGY

## 2024-03-20 RX ORDER — METRONIDAZOLE 500 MG/1
500 TABLET ORAL 2 TIMES DAILY
Qty: 14 TABLET | Refills: 0 | Status: SHIPPED | OUTPATIENT
Start: 2024-03-20 | End: 2024-03-27

## 2024-03-20 NOTE — PROGRESS NOTES
Patient's last menstrual period was 07/05/2023.  Please reference prenatal and OB flow chart for further information  PT here today for routine prenatal care  Pt endorses fetal movement and denies loss of fluid, contractions or vaginal bleeding  Pt without complaints  ROS:  Pt denies headache, dysuria, nausea/vomiting  PT denies chest pain or SOB  PE:  /80   Wt 101.6 kg (224 lb)   LMP 07/05/2023   BMI 38.45 kg/m²   Gen - Alert and oriented x 3  HEENT- NC/AT, CVS - RRR, Lungs - CTAB  Abd - FH Appropriate fetal growth  LE no edema  Reassuring fetal status at this time     Diagnosis Orders   1. 37 weeks gestation of pregnancy        2. Encounter for supervision pregnancy in primigravida, antepartum            Upon completion of the visit all questions were answered and the patients follow-up and testing schedule were reviewed.  Pt recommended to continue pnvit and iron if ordered along with other supporting meds  Spent 20-25 min total time with pt

## 2024-03-27 ENCOUNTER — ROUTINE PRENATAL (OUTPATIENT)
Dept: OBGYN CLINIC | Age: 19
End: 2024-03-27
Payer: MEDICAID

## 2024-03-27 VITALS — DIASTOLIC BLOOD PRESSURE: 72 MMHG | WEIGHT: 224 LBS | SYSTOLIC BLOOD PRESSURE: 108 MMHG | BODY MASS INDEX: 38.45 KG/M2

## 2024-03-27 DIAGNOSIS — Z3A.38 38 WEEKS GESTATION OF PREGNANCY: Primary | ICD-10-CM

## 2024-03-27 DIAGNOSIS — Z34.00 SUPERVISION OF NORMAL FIRST PREGNANCY, ANTEPARTUM: ICD-10-CM

## 2024-03-27 PROCEDURE — 99213 OFFICE O/P EST LOW 20 MIN: CPT | Performed by: OBSTETRICS & GYNECOLOGY

## 2024-03-27 NOTE — PROGRESS NOTES
Patient's last menstrual period was 07/05/2023.  Please reference prenatal and OB flow chart for further information  PT here today for routine prenatal care  Pt endorses fetal movement and denies loss of fluid, contractions or vaginal bleeding  Pt without complaints  ROS:  Pt denies headache, dysuria, nausea/vomiting  PT denies chest pain or SOB  PE:  /72   Wt 101.6 kg (224 lb)   LMP 07/05/2023   BMI 38.45 kg/m²   Gen - Alert and oriented x 3  HEENT- NC/AT, CVS - RRR, Lungs - CTAB  Abd - FH Appropriate fetal growth  LE no edema  Reassuring fetal status at this time     Diagnosis Orders   1. 38 weeks gestation of pregnancy        2. Supervision of normal first pregnancy, antepartum            Upon completion of the visit all questions were answered and the patients follow-up and testing schedule were reviewed.  Pt recommended to continue pnvit and iron if ordered along with other supporting meds  Spent 20-25 min total time with pt

## 2024-04-01 ENCOUNTER — ANESTHESIA (OUTPATIENT)
Dept: LABOR AND DELIVERY | Age: 19
DRG: 560 | End: 2024-04-01
Payer: MEDICAID

## 2024-04-01 ENCOUNTER — HOSPITAL ENCOUNTER (INPATIENT)
Age: 19
LOS: 2 days | Discharge: HOME | DRG: 560 | End: 2024-04-03
Attending: OBSTETRICS & GYNECOLOGY | Admitting: OBSTETRICS & GYNECOLOGY
Payer: MEDICAID

## 2024-04-01 ENCOUNTER — ANESTHESIA EVENT (OUTPATIENT)
Dept: LABOR AND DELIVERY | Age: 19
DRG: 560 | End: 2024-04-01
Payer: MEDICAID

## 2024-04-01 PROBLEM — Z34.90 TERM PREGNANCY: Status: ACTIVE | Noted: 2024-04-01

## 2024-04-01 PROBLEM — Z3A.38 38 WEEKS GESTATION OF PREGNANCY: Status: ACTIVE | Noted: 2024-04-01

## 2024-04-01 LAB
ABO + RH BLD: NORMAL
ALBUMIN SERPL-MCNC: 3.5 G/DL (ref 3.5–4.6)
ALP SERPL-CCNC: 179 U/L (ref 40–130)
ALT SERPL-CCNC: 11 U/L (ref 0–33)
AMPHET UR QL SCN: NORMAL
ANION GAP SERPL CALCULATED.3IONS-SCNC: 17 MEQ/L (ref 9–15)
AST SERPL-CCNC: 14 U/L (ref 0–35)
BARBITURATES UR QL SCN: NORMAL
BASOPHILS # BLD: 0 K/UL (ref 0–0.2)
BASOPHILS NFR BLD: 0.2 %
BENZODIAZ UR QL SCN: NORMAL
BILIRUB SERPL-MCNC: 0.3 MG/DL (ref 0.2–0.7)
BILIRUB UR QL STRIP: NEGATIVE
BLD GP AB SCN SERPL QL: NORMAL
BUN SERPL-MCNC: 8 MG/DL (ref 6–20)
CALCIUM SERPL-MCNC: 9.3 MG/DL (ref 8.5–9.9)
CANNABINOIDS UR QL SCN: NORMAL
CHLORIDE SERPL-SCNC: 103 MEQ/L (ref 95–107)
CLARITY UR: CLEAR
CO2 SERPL-SCNC: 16 MEQ/L (ref 20–31)
COCAINE UR QL SCN: NORMAL
COLOR UR: YELLOW
CREAT SERPL-MCNC: 0.52 MG/DL (ref 0.5–0.9)
DRUG SCREEN COMMENT UR-IMP: NORMAL
EOSINOPHIL # BLD: 0 K/UL (ref 0–0.7)
EOSINOPHIL NFR BLD: 0.3 %
ERYTHROCYTE [DISTWIDTH] IN BLOOD BY AUTOMATED COUNT: 13.9 % (ref 11.5–14.5)
FENTANYL SCREEN, URINE: NORMAL
GLOBULIN SER CALC-MCNC: 3.1 G/DL (ref 2.3–3.5)
GLUCOSE SERPL-MCNC: 96 MG/DL (ref 70–99)
GLUCOSE UR STRIP-MCNC: NEGATIVE MG/DL
HBV SURFACE AG SERPL QL IA: NORMAL
HCT VFR BLD AUTO: 34.9 % (ref 37–47)
HGB BLD-MCNC: 11.2 G/DL (ref 12–16)
HGB UR QL STRIP: NEGATIVE
HIV AG/AB: NONREACTIVE
KETONES UR STRIP-MCNC: 15 MG/DL
LEUKOCYTE ESTERASE UR QL STRIP: NEGATIVE
LYMPHOCYTES # BLD: 1.6 K/UL (ref 1–4.8)
LYMPHOCYTES NFR BLD: 12.5 %
MCH RBC QN AUTO: 27.9 PG (ref 27–31.3)
MCHC RBC AUTO-ENTMCNC: 32.1 % (ref 33–37)
MCV RBC AUTO: 87 FL (ref 79.4–94.8)
METHADONE UR QL SCN: NORMAL
MONOCYTES # BLD: 0.4 K/UL (ref 0.2–0.8)
MONOCYTES NFR BLD: 3.2 %
NEUTROPHILS # BLD: 10.9 K/UL (ref 1.4–6.5)
NEUTS SEG NFR BLD: 83.3 %
NITRITE UR QL STRIP: NEGATIVE
OPIATES UR QL SCN: NORMAL
OXYCODONE UR QL SCN: NORMAL
PCP UR QL SCN: NORMAL
PH UR STRIP: 7.5 [PH] (ref 5–9)
PLACENTA ALPHA MICROGLOBULIN-1: POSITIVE
PLATELET # BLD AUTO: 240 K/UL (ref 130–400)
POTASSIUM SERPL-SCNC: 3.4 MEQ/L (ref 3.4–4.9)
PROPOXYPH UR QL SCN: NORMAL
PROT SERPL-MCNC: 6.6 G/DL (ref 6.3–8)
PROT UR STRIP-MCNC: NEGATIVE MG/DL
RBC # BLD AUTO: 4.01 M/UL (ref 4.2–5.4)
SODIUM SERPL-SCNC: 136 MEQ/L (ref 135–144)
SP GR UR STRIP: 1.02 (ref 1–1.03)
URINE REFLEX TO CULTURE: ABNORMAL
UROBILINOGEN UR STRIP-ACNC: 0.2 E.U./DL
WBC # BLD AUTO: 13.1 K/UL (ref 4.5–11)

## 2024-04-01 PROCEDURE — 1220000000 HC SEMI PRIVATE OB R&B

## 2024-04-01 PROCEDURE — 81003 URINALYSIS AUTO W/O SCOPE: CPT

## 2024-04-01 PROCEDURE — 2500000003 HC RX 250 WO HCPCS: Performed by: NURSE ANESTHETIST, CERTIFIED REGISTERED

## 2024-04-01 PROCEDURE — 59409 OBSTETRICAL CARE: CPT | Performed by: OBSTETRICS & GYNECOLOGY

## 2024-04-01 PROCEDURE — 80307 DRUG TEST PRSMV CHEM ANLYZR: CPT

## 2024-04-01 PROCEDURE — 87340 HEPATITIS B SURFACE AG IA: CPT

## 2024-04-01 PROCEDURE — 80053 COMPREHEN METABOLIC PANEL: CPT

## 2024-04-01 PROCEDURE — 6370000000 HC RX 637 (ALT 250 FOR IP): Performed by: OBSTETRICS & GYNECOLOGY

## 2024-04-01 PROCEDURE — 86850 RBC ANTIBODY SCREEN: CPT

## 2024-04-01 PROCEDURE — 84112 EVAL AMNIOTIC FLUID PROTEIN: CPT

## 2024-04-01 PROCEDURE — 2580000003 HC RX 258: Performed by: OBSTETRICS & GYNECOLOGY

## 2024-04-01 PROCEDURE — 6360000002 HC RX W HCPCS: Performed by: OBSTETRICS & GYNECOLOGY

## 2024-04-01 PROCEDURE — 87389 HIV-1 AG W/HIV-1&-2 AB AG IA: CPT

## 2024-04-01 PROCEDURE — 85025 COMPLETE CBC W/AUTO DIFF WBC: CPT

## 2024-04-01 PROCEDURE — 86900 BLOOD TYPING SEROLOGIC ABO: CPT

## 2024-04-01 PROCEDURE — 86901 BLOOD TYPING SEROLOGIC RH(D): CPT

## 2024-04-01 PROCEDURE — 86592 SYPHILIS TEST NON-TREP QUAL: CPT

## 2024-04-01 PROCEDURE — 3700000025 EPIDURAL BLOCK: Performed by: NURSE ANESTHETIST, CERTIFIED REGISTERED

## 2024-04-01 RX ORDER — NALBUPHINE HYDROCHLORIDE 20 MG/ML
5 INJECTION, SOLUTION INTRAMUSCULAR; INTRAVENOUS; SUBCUTANEOUS
Status: DISCONTINUED | OUTPATIENT
Start: 2024-04-01 | End: 2024-04-01

## 2024-04-01 RX ORDER — SODIUM CHLORIDE, SODIUM LACTATE, POTASSIUM CHLORIDE, AND CALCIUM CHLORIDE .6; .31; .03; .02 G/100ML; G/100ML; G/100ML; G/100ML
500 INJECTION, SOLUTION INTRAVENOUS PRN
Status: DISCONTINUED | OUTPATIENT
Start: 2024-04-01 | End: 2024-04-01

## 2024-04-01 RX ORDER — MISOPROSTOL 200 UG/1
400 TABLET ORAL PRN
Status: DISCONTINUED | OUTPATIENT
Start: 2024-04-01 | End: 2024-04-03 | Stop reason: HOSPADM

## 2024-04-01 RX ORDER — CARBOPROST TROMETHAMINE 250 UG/ML
250 INJECTION, SOLUTION INTRAMUSCULAR PRN
Status: DISCONTINUED | OUTPATIENT
Start: 2024-04-01 | End: 2024-04-01

## 2024-04-01 RX ORDER — DOCUSATE SODIUM 100 MG/1
100 CAPSULE, LIQUID FILLED ORAL DAILY
Status: DISCONTINUED | OUTPATIENT
Start: 2024-04-01 | End: 2024-04-03 | Stop reason: HOSPADM

## 2024-04-01 RX ORDER — DOCUSATE SODIUM 100 MG/1
100 CAPSULE, LIQUID FILLED ORAL 2 TIMES DAILY PRN
Status: DISCONTINUED | OUTPATIENT
Start: 2024-04-01 | End: 2024-04-01

## 2024-04-01 RX ORDER — SODIUM CHLORIDE 0.9 % (FLUSH) 0.9 %
5-40 SYRINGE (ML) INJECTION PRN
Status: DISCONTINUED | OUTPATIENT
Start: 2024-04-01 | End: 2024-04-01

## 2024-04-01 RX ORDER — ONDANSETRON 2 MG/ML
4 INJECTION INTRAMUSCULAR; INTRAVENOUS EVERY 6 HOURS PRN
Status: DISCONTINUED | OUTPATIENT
Start: 2024-04-01 | End: 2024-04-03 | Stop reason: HOSPADM

## 2024-04-01 RX ORDER — ACETAMINOPHEN 500 MG
1000 TABLET ORAL EVERY 8 HOURS SCHEDULED
Status: DISCONTINUED | OUTPATIENT
Start: 2024-04-01 | End: 2024-04-03 | Stop reason: HOSPADM

## 2024-04-01 RX ORDER — ACETAMINOPHEN 325 MG/1
650 TABLET ORAL EVERY 4 HOURS PRN
Status: DISCONTINUED | OUTPATIENT
Start: 2024-04-01 | End: 2024-04-01

## 2024-04-01 RX ORDER — IBUPROFEN 800 MG/1
800 TABLET ORAL EVERY 8 HOURS SCHEDULED
Status: DISCONTINUED | OUTPATIENT
Start: 2024-04-01 | End: 2024-04-03 | Stop reason: HOSPADM

## 2024-04-01 RX ORDER — MODIFIED LANOLIN
OINTMENT (GRAM) TOPICAL PRN
Status: DISCONTINUED | OUTPATIENT
Start: 2024-04-01 | End: 2024-04-03 | Stop reason: HOSPADM

## 2024-04-01 RX ORDER — SODIUM CHLORIDE 0.9 % (FLUSH) 0.9 %
5-40 SYRINGE (ML) INJECTION PRN
Status: DISCONTINUED | OUTPATIENT
Start: 2024-04-01 | End: 2024-04-03 | Stop reason: HOSPADM

## 2024-04-01 RX ORDER — SODIUM CHLORIDE, SODIUM LACTATE, POTASSIUM CHLORIDE, AND CALCIUM CHLORIDE .6; .31; .03; .02 G/100ML; G/100ML; G/100ML; G/100ML
1000 INJECTION, SOLUTION INTRAVENOUS PRN
Status: DISCONTINUED | OUTPATIENT
Start: 2024-04-01 | End: 2024-04-01

## 2024-04-01 RX ORDER — ONDANSETRON 4 MG/1
4 TABLET, ORALLY DISINTEGRATING ORAL EVERY 6 HOURS PRN
Status: DISCONTINUED | OUTPATIENT
Start: 2024-04-01 | End: 2024-04-03 | Stop reason: HOSPADM

## 2024-04-01 RX ORDER — METHYLERGONOVINE MALEATE 0.2 MG/ML
200 INJECTION INTRAVENOUS PRN
Status: DISCONTINUED | OUTPATIENT
Start: 2024-04-01 | End: 2024-04-01

## 2024-04-01 RX ORDER — SODIUM CHLORIDE 0.9 % (FLUSH) 0.9 %
5-40 SYRINGE (ML) INJECTION EVERY 12 HOURS SCHEDULED
Status: DISCONTINUED | OUTPATIENT
Start: 2024-04-01 | End: 2024-04-01

## 2024-04-01 RX ORDER — BISACODYL 10 MG
10 SUPPOSITORY, RECTAL RECTAL DAILY PRN
Status: DISCONTINUED | OUTPATIENT
Start: 2024-04-01 | End: 2024-04-03 | Stop reason: HOSPADM

## 2024-04-01 RX ORDER — LIDOCAINE HYDROCHLORIDE AND EPINEPHRINE 15; 5 MG/ML; UG/ML
INJECTION, SOLUTION EPIDURAL PRN
Status: DISCONTINUED | OUTPATIENT
Start: 2024-04-01 | End: 2024-04-01 | Stop reason: SDUPTHER

## 2024-04-01 RX ORDER — SODIUM CHLORIDE 0.9 % (FLUSH) 0.9 %
5-40 SYRINGE (ML) INJECTION EVERY 12 HOURS SCHEDULED
Status: DISCONTINUED | OUTPATIENT
Start: 2024-04-01 | End: 2024-04-03 | Stop reason: HOSPADM

## 2024-04-01 RX ORDER — SODIUM CHLORIDE 9 MG/ML
INJECTION, SOLUTION INTRAVENOUS PRN
Status: DISCONTINUED | OUTPATIENT
Start: 2024-04-01 | End: 2024-04-01

## 2024-04-01 RX ORDER — SODIUM CHLORIDE, SODIUM LACTATE, POTASSIUM CHLORIDE, CALCIUM CHLORIDE 600; 310; 30; 20 MG/100ML; MG/100ML; MG/100ML; MG/100ML
INJECTION, SOLUTION INTRAVENOUS CONTINUOUS
Status: DISCONTINUED | OUTPATIENT
Start: 2024-04-01 | End: 2024-04-01

## 2024-04-01 RX ORDER — LIDOCAINE HYDROCHLORIDE 20 MG/ML
INJECTION, SOLUTION INFILTRATION; PERINEURAL
Status: DISCONTINUED
Start: 2024-04-01 | End: 2024-04-01

## 2024-04-01 RX ORDER — SODIUM CHLORIDE 9 MG/ML
INJECTION, SOLUTION INTRAVENOUS PRN
Status: DISCONTINUED | OUTPATIENT
Start: 2024-04-01 | End: 2024-04-03 | Stop reason: HOSPADM

## 2024-04-01 RX ORDER — NALOXONE HYDROCHLORIDE 0.4 MG/ML
INJECTION, SOLUTION INTRAMUSCULAR; INTRAVENOUS; SUBCUTANEOUS PRN
Status: DISCONTINUED | OUTPATIENT
Start: 2024-04-01 | End: 2024-04-01

## 2024-04-01 RX ADMIN — Medication 7 ML: at 13:43

## 2024-04-01 RX ADMIN — BENZOCAINE AND LEVOMENTHOL: 200; 5 SPRAY TOPICAL at 22:04

## 2024-04-01 RX ADMIN — SODIUM CHLORIDE, POTASSIUM CHLORIDE, SODIUM LACTATE AND CALCIUM CHLORIDE 1000 ML: 600; 310; 30; 20 INJECTION, SOLUTION INTRAVENOUS at 12:37

## 2024-04-01 RX ADMIN — Medication 1 MILLI-UNITS/MIN: at 14:29

## 2024-04-01 RX ADMIN — IBUPROFEN 800 MG: 800 TABLET, FILM COATED ORAL at 22:04

## 2024-04-01 RX ADMIN — ACETAMINOPHEN 1000 MG: 500 TABLET ORAL at 22:04

## 2024-04-01 RX ADMIN — LIDOCAINE HYDROCHLORIDE AND EPINEPHRINE 5 ML: 15; 5 INJECTION, SOLUTION EPIDURAL at 13:32

## 2024-04-01 RX ADMIN — Medication 10 ML/HR: at 13:49

## 2024-04-01 ASSESSMENT — PAIN SCALES - GENERAL: PAINLEVEL_OUTOF10: 2

## 2024-04-01 NOTE — FLOWSHEET NOTE
Dr Alber Aranda at bedside, viewed strip. Patient may be prepped to practice push at this time. Woodard catheter removed.

## 2024-04-01 NOTE — H&P
Department of Obstetrics and Gynecology  Attending Obstetrics History and Physical        CHIEF COMPLAINT:  srom 1030 a clear    HISTORY OF PRESENT ILLNESS:      The patient is  @ 38.5weeks presents with spontaneous rupture of membranes  with contractions. Pt has had an uncomplicated .patient is having contractions that are uncomfortable. She was 2cm in office 4cm at presentation.  Patient presents with a chief complaint as above and is being admitted for active phase labor      Past Medical History:    History reviewed. No pertinent past medical history.  Past Surgical History:        Procedure Laterality Date    HERNIA REPAIR      Age: 2     Social History:    TOBACCO:   reports that she has never smoked. She has never used smokeless tobacco.  ETOH:   reports no history of alcohol use.  DRUGS:   reports no history of drug use.  SEXUAL HISTORY:    DOMESTIC VIOLENCE:  no  ACTIVITIES OF DAILY LIVING:    INSTRUMENTAL ACTIVITIES OF DAILY LIVING:  Patient is able to perform all instrumental activities of daily living.  MARITAL STATUS:    OCCUPATION:    LEVEL OF EDUCATION:    Patient currently lives with family   Environmental/chemical exposure:  None  Travel History:  None   Pets:  None    Family History:   History reviewed. No pertinent family history.  Medications Prior to Admission:  Medications Prior to Admission: acetaminophen (TYLENOL) 500 MG tablet, Take 2 tablets by mouth every 6 hours as needed for Pain (Patient not taking: Reported on 2024)  terconazole (TERAZOL 7) 0.4 % vaginal cream, Place vaginally nightly. (Patient not taking: Reported on 3/27/2024)  Prenatal MV-Min-Fe Fum-FA-DHA (PRENATAL MULTI +DHA) 27-0.8-200 MG CAPS, Take 1 capsule by mouth daily (Patient not taking: Reported on 2024)  fluticasone (FLONASE) 50 MCG/ACT nasal spray, 1 spray by Each Nostril route in the morning and at bedtime for 14 days  Allergies:  Patient has no known allergies.    REVIEW OF SYSTEMS:    Patient has no

## 2024-04-01 NOTE — FLOWSHEET NOTE
Pt presents to triage with c/o ROM at 1035. Pt denies vaginal bleeding and recent intercourse. Reports positive fetal movement. Pt placed on external monitors and labs sent.

## 2024-04-01 NOTE — ANESTHESIA PRE PROCEDURE
3.73 01/06/2024 10:51 AM    HGB 10.8 01/06/2024 10:51 AM    HCT 34.0 01/06/2024 10:51 AM    MCV 91.2 01/06/2024 10:51 AM    RDW 13.3 01/06/2024 10:51 AM     01/06/2024 10:51 AM       CMP:   Lab Results   Component Value Date/Time     11/03/2023 04:20 PM    K 4.4 11/03/2023 04:20 PM     11/03/2023 04:20 PM    CO2 22 11/03/2023 04:20 PM    BUN 7 11/03/2023 04:20 PM    CREATININE 0.60 11/03/2023 04:20 PM    LABGLOM >60.0 11/03/2023 04:20 PM    GLUCOSE 64 11/03/2023 04:20 PM    PROT 6.7 11/03/2023 04:20 PM    CALCIUM 9.4 11/03/2023 04:20 PM    BILITOT 0.3 11/03/2023 04:20 PM    ALKPHOS 82 11/03/2023 04:20 PM    AST 15 11/03/2023 04:20 PM    ALT 11 11/03/2023 04:20 PM       POC Tests: No results for input(s): \"POCGLU\", \"POCNA\", \"POCK\", \"POCCL\", \"POCBUN\", \"POCHEMO\", \"POCHCT\" in the last 72 hours.    Coags: No results found for: \"PROTIME\", \"INR\", \"APTT\"    HCG (If Applicable):   Lab Results   Component Value Date    PREGTESTUR Negative 01/16/2018        ABGs: No results found for: \"PHART\", \"PO2ART\", \"EIJ3HEG\", \"JNY7UOI\", \"BEART\", \"T9HBIOIR\"     Type & Screen (If Applicable):  No results found for: \"LABABO\", \"LABRH\"    Drug/Infectious Status (If Applicable):  Lab Results   Component Value Date/Time    HEPCAB NONREACTIVE 09/28/2023 02:41 PM       COVID-19 Screening (If Applicable): No results found for: \"COVID19\"        Anesthesia Evaluation  Patient summary reviewed and Nursing notes reviewed   no history of anesthetic complications:   Airway: Mallampati: II  TM distance: >3 FB   Neck ROM: full  Mouth opening: > = 3 FB   Dental: normal exam         Pulmonary:Negative Pulmonary ROS and normal exam                               Cardiovascular:Negative CV ROS                      Neuro/Psych:   Negative Neuro/Psych ROS              GI/Hepatic/Renal: Neg GI/Hepatic/Renal ROS            Endo/Other: Negative Endo/Other ROS                    Abdominal:   (+) obese          Vascular: negative vascular

## 2024-04-01 NOTE — FLOWSHEET NOTE
Dr Alber alonso update on patient SROM and SVE, admit for labor and delivery, patient may have epidural

## 2024-04-01 NOTE — PROCEDURES
PROCEDURE NOTE: VAGINAL DELIVERY  18 y.o.  at 38w5d GA who presented to OB triage for active labor. Pt underwent expectant management of labor and SROM noting clear fluid. Pt with epidural for pain management. Pt then with rectal pressure and the desire to push. PT was instructed on pushing efforts and the infant's head was delivered atraumatically followed quickly by the rest of the body over MLE.  Nuchal cord x 1 was noted dble clamped and cut upon delivery. The infant with spontaneous cry was then laid on the mother's abdomen.  the infant was placed for skin to skin care. The cord blood was then drawn for labs and the placenta delivered spontaneously. Three vessel cord noted.  The vaginal and cervix were inspected and no lacerations were noted.   The infant, a viable male infant was born at 1617 with Apgars 8 and 9 and wt pending  Mother was noted to have excellent uterine tone. Sponge and instrument counts were correct x 2 and mother and baby were recovered in room without difficulty.     EBL: 100cc    Elisabet Baig DO

## 2024-04-01 NOTE — FLOWSHEET NOTE
Patient actively pushing. RN remains in continuous attendance at the bedside. Assessment & evaluation of fetal heart rate ongoing via continuous EFM' every 30 minutes.

## 2024-04-01 NOTE — FLOWSHEET NOTE
CRNA in room `1325  Patient sitting up 1327  Time out 1328  Test dose 1332  Bolus 1343   Wedged to side 1349

## 2024-04-01 NOTE — FLOWSHEET NOTE
Patient up to bathroom x2 staff standby assist, Patient voided 500ml into hat, ambulated independently back to bed.

## 2024-04-01 NOTE — FLOWSHEET NOTE
[id number] RN remains at bedside, fetal heart tones audible and baseline in 110's.  1235- Dr Alber Aranda at bedside, viewed strip. SVE 5-6 cm.  1304- FSE placed at his time, patient tolerated well.  1309 patient to right side  1310 patient to left side  1311 oxygen placed at this time  1312 patient on hands knees  1313 staff assist called.  1315 Dr Sevilla at bedside, veiwed strip, SVE 8cm. FSE adjusted  1316 External moniotrs placed, fFSE discontinued at thsi time.      [id number] Dr Alber Aranda remains in facility, viewed strip continuously.

## 2024-04-01 NOTE — ANESTHESIA PROCEDURE NOTES
Epidural Block    Patient location during procedure: OB  Start time: 4/1/2024 1:25 PM  End time: 4/1/2024 2:47 PM  Reason for block: labor epidural  Staffing  Performed: resident/CRNA   Resident/CRNA: Ciara Phan APRN - CRNA  Performed by: Ciara Phan APRN - CRNA  Authorized by: Ciara Phan APRN - CRNA    Epidural  Patient position: sitting  Prep: Betadine and site prepped and draped  Patient monitoring: continuous pulse ox, frequent blood pressure checks and cardiac monitor  Approach: midline  Location: L3-4  Injection technique: JESUS air  Provider prep: mask and sterile gloves  Needle  Needle type: Tuohy   Needle gauge: 17 G  Needle length: 3.5 in  Needle insertion depth: 8 cm  Catheter type: side hole  Catheter size: 20 G  Catheter at skin depth: 16 cm  Test dose: negative  Lot number: [id number]  Expiration date: 11/30/2024Catheter Secured: tegaderm and tape  Assessment  Sensory level: T8  Hemodynamics: stable  Attempts: 1  Outcomes: uncomplicated and patient tolerated procedure well  Additional Notes  Intended dural puncture with 24g Tish Asa needle, positive csf, epidural catheter threaded in the epidural space.   Preanesthetic Checklist  Completed: patient identified, IV checked, site marked, risks and benefits discussed, surgical/procedural consents, equipment checked, pre-op evaluation, timeout performed, anesthesia consent given, oxygen available, monitors applied/VS acknowledged, fire risk safety assessment completed and verbalized and blood product R/B/A discussed and consented

## 2024-04-01 NOTE — FLOWSHEET NOTE
Dr Baig notified in OR made aware of FHR in the 90's with O2 in use and in knee chest without improvement. states she will be up shortly. Dr Sevilla at bedside.

## 2024-04-02 LAB
BASOPHILS # BLD: 0 K/UL (ref 0–0.2)
BASOPHILS NFR BLD: 0.1 %
EOSINOPHIL # BLD: 0 K/UL (ref 0–0.7)
EOSINOPHIL NFR BLD: 0.1 %
ERYTHROCYTE [DISTWIDTH] IN BLOOD BY AUTOMATED COUNT: 14.4 % (ref 11.5–14.5)
HCT VFR BLD AUTO: 29.4 % (ref 37–47)
HGB BLD-MCNC: 9.4 G/DL (ref 12–16)
LYMPHOCYTES # BLD: 2 K/UL (ref 1–4.8)
LYMPHOCYTES NFR BLD: 14.1 %
MCH RBC QN AUTO: 28 PG (ref 27–31.3)
MCHC RBC AUTO-ENTMCNC: 32 % (ref 33–37)
MCV RBC AUTO: 87.5 FL (ref 79.4–94.8)
MONOCYTES # BLD: 0.9 K/UL (ref 0.2–0.8)
MONOCYTES NFR BLD: 6.3 %
NEUTROPHILS # BLD: 11.1 K/UL (ref 1.4–6.5)
NEUTS SEG NFR BLD: 78.9 %
PLATELET # BLD AUTO: 217 K/UL (ref 130–400)
RBC # BLD AUTO: 3.36 M/UL (ref 4.2–5.4)
RPR SER QL: NORMAL
WBC # BLD AUTO: 14 K/UL (ref 4.5–11)

## 2024-04-02 PROCEDURE — 99024 POSTOP FOLLOW-UP VISIT: CPT | Performed by: OBSTETRICS & GYNECOLOGY

## 2024-04-02 PROCEDURE — 1220000000 HC SEMI PRIVATE OB R&B

## 2024-04-02 PROCEDURE — 36415 COLL VENOUS BLD VENIPUNCTURE: CPT

## 2024-04-02 PROCEDURE — 6370000000 HC RX 637 (ALT 250 FOR IP): Performed by: OBSTETRICS & GYNECOLOGY

## 2024-04-02 PROCEDURE — 85025 COMPLETE CBC W/AUTO DIFF WBC: CPT

## 2024-04-02 RX ORDER — FERROUS SULFATE 325(65) MG
325 TABLET ORAL 2 TIMES DAILY WITH MEALS
Status: DISCONTINUED | OUTPATIENT
Start: 2024-04-02 | End: 2024-04-03 | Stop reason: HOSPADM

## 2024-04-02 RX ADMIN — FERROUS SULFATE TAB 325 MG (65 MG ELEMENTAL FE) 325 MG: 325 (65 FE) TAB at 09:22

## 2024-04-02 RX ADMIN — FERROUS SULFATE TAB 325 MG (65 MG ELEMENTAL FE) 325 MG: 325 (65 FE) TAB at 16:14

## 2024-04-02 RX ADMIN — IBUPROFEN 800 MG: 800 TABLET, FILM COATED ORAL at 16:14

## 2024-04-02 RX ADMIN — ACETAMINOPHEN 1000 MG: 500 TABLET ORAL at 19:42

## 2024-04-02 RX ADMIN — ACETAMINOPHEN 1000 MG: 500 TABLET ORAL at 11:41

## 2024-04-02 RX ADMIN — IBUPROFEN 800 MG: 800 TABLET, FILM COATED ORAL at 07:49

## 2024-04-02 RX ADMIN — DOCUSATE SODIUM 100 MG: 100 CAPSULE, LIQUID FILLED ORAL at 09:22

## 2024-04-02 ASSESSMENT — PAIN DESCRIPTION - LOCATION: LOCATION: ABDOMEN

## 2024-04-02 ASSESSMENT — PAIN SCALES - GENERAL
PAINLEVEL_OUTOF10: 5
PAINLEVEL_OUTOF10: 3
PAINLEVEL_OUTOF10: 3
PAINLEVEL_OUTOF10: 4

## 2024-04-02 ASSESSMENT — PAIN DESCRIPTION - DESCRIPTORS: DESCRIPTORS: ACHING

## 2024-04-02 NOTE — FLOWSHEET NOTE
Patient ambulated to room 327 with nurse standby assist, tolerated well. Encouraged to call for assistance as needed.

## 2024-04-02 NOTE — PROGRESS NOTES
Department of Obstetrics and Gynecology  Labor and Delivery  Ciaran eSvilla MD: Post Partum Progress Note      SUBJECTIVE:  Patient is doing well. Pain is well controlled. Lochia is less than menses. She is afebrile. She is breast feeding. She is eating, ambulating voiding without difficulty.     OBJECTIVE:      ROS:   Abdomen: +cramping  Gyn: Lochia  All systems were reviewed and found to be negative.     Vitals:  /74   Pulse 89   Temp 98.1 °F (36.7 °C) (Oral)   Resp 16   Ht 1.626 m (5' 4\")   Wt 101.6 kg (224 lb)   LMP 07/05/2023   SpO2 100%   Breastfeeding Unknown   BMI 38.45 kg/m²     PE:   Gen: AxO x 3, In NAD  CV: RRR, no M/R/G  Lungs: CTAB, No W/C/R  Abdomen: soft, +BS, NTND  Uterus: Firm @ U-1  Ex: No C/C/E    DATA:    Results     Component Value Units   CBC with Auto Differential [[id number]] (Abnormal)    Collected: 04/02/24 0324    Updated: 04/02/24 0335    Specimen Type: Blood     WBC 14.0 High  K/uL    RBC 3.36 Low  M/uL    Hemoglobin 9.4 Low  g/dL    Hematocrit 29.4 Low  %    MCV 87.5 fL    MCH 28.0 pg    MCHC 32.0 Low  %    RDW 14.4 %    Platelets 217 K/uL    Neutrophils % 78.9 %    Lymphocytes % 14.1 %    Monocytes % 6.3 %    Eosinophils % 0.1 %    Basophils % 0.1 %    Neutrophils Absolute 11.1 High  K/uL    Lymphocytes Absolute 2.0 K/uL    Monocytes Absolute 0.9 High  K/uL    Eosinophils Absolute 0.0 K/uL    Basophils Absolute 0.0 K/uL   HIV Screen [[id number]]    Collected: 04/01/24 1246    Updated: 04/01/24 1713    Specimen Source: Blood     HIV Ag/Ab NONREACTIVE    Comment: No laboratory evidence of HIV infection.  If acute HIV infection is  suspected, consider testing for HIV-1 RNA.  Performed at [facility], [address] [place] [id number]  [phone].      TYPE AND SCREEN [[id number]]    Collected: 04/01/24 1215    Updated: 04/01/24 1347    Specimen Source: Blood     ABO/Rh A POS    Antibody Screen NEG   Narrative:     HX of A Pos from LE encounter 04/01/24 13:29

## 2024-04-02 NOTE — ANESTHESIA POSTPROCEDURE EVALUATION
Department of Anesthesiology  Postprocedure Note    Patient: Rose Mary Momin  MRN: [id number]  YOB: 2005  Date of evaluation: 4/2/2024    Procedure Summary       Date: 04/01/24 Room / Location:     Anesthesia Start: 1325 Anesthesia Stop: 1617    Procedure: Labor Analgesia Diagnosis:     Scheduled Providers:  Responsible Provider: Ciara Phan APRN - CRNA    Anesthesia Type: epidural ASA Status: 2            Anesthesia Type: No value filed.    Ira Phase I: Ira Score: 10    Ira Phase II:      Anesthesia Post Evaluation    Patient location during evaluation: bedside  Patient participation: complete - patient participated  Level of consciousness: awake and awake and alert  Pain score: 2  Airway patency: patent  Nausea & Vomiting: no nausea and no vomiting  Cardiovascular status: blood pressure returned to baseline and hemodynamically stable  Respiratory status: acceptable  Hydration status: euvolemic  Pain management: adequate        No notable events documented.

## 2024-04-03 VITALS
RESPIRATION RATE: 18 BRPM | SYSTOLIC BLOOD PRESSURE: 127 MMHG | OXYGEN SATURATION: 100 % | WEIGHT: 224 LBS | HEIGHT: 64 IN | TEMPERATURE: 97.4 F | DIASTOLIC BLOOD PRESSURE: 81 MMHG | HEART RATE: 77 BPM | BODY MASS INDEX: 38.24 KG/M2

## 2024-04-03 PROCEDURE — 99024 POSTOP FOLLOW-UP VISIT: CPT | Performed by: OBSTETRICS & GYNECOLOGY

## 2024-04-03 PROCEDURE — 6370000000 HC RX 637 (ALT 250 FOR IP): Performed by: OBSTETRICS & GYNECOLOGY

## 2024-04-03 PROCEDURE — 7200000001 HC VAGINAL DELIVERY

## 2024-04-03 RX ORDER — IBUPROFEN 800 MG/1
800 TABLET ORAL EVERY 8 HOURS SCHEDULED
Qty: 30 TABLET | Refills: 1 | Status: SHIPPED | OUTPATIENT
Start: 2024-04-03 | End: 2024-04-13

## 2024-04-03 RX ORDER — FERROUS SULFATE 325(65) MG
325 TABLET ORAL 2 TIMES DAILY WITH MEALS
Qty: 30 TABLET | Refills: 1 | Status: SHIPPED | OUTPATIENT
Start: 2024-04-03 | End: 2024-04-18

## 2024-04-03 RX ORDER — PSEUDOEPHEDRINE HCL 30 MG
100 TABLET ORAL DAILY
Qty: 30 CAPSULE | Refills: 1 | Status: SHIPPED | OUTPATIENT
Start: 2024-04-03 | End: 2024-05-03

## 2024-04-03 RX ADMIN — BENZOCAINE AND LEVOMENTHOL: 200; 5 SPRAY TOPICAL at 08:30

## 2024-04-03 RX ADMIN — DOCUSATE SODIUM 100 MG: 100 CAPSULE, LIQUID FILLED ORAL at 08:24

## 2024-04-03 RX ADMIN — IBUPROFEN 800 MG: 800 TABLET, FILM COATED ORAL at 08:24

## 2024-04-03 RX ADMIN — FERROUS SULFATE TAB 325 MG (65 MG ELEMENTAL FE) 325 MG: 325 (65 FE) TAB at 08:24

## 2024-04-03 RX ADMIN — IBUPROFEN 800 MG: 800 TABLET, FILM COATED ORAL at 00:48

## 2024-04-03 RX ADMIN — ACETAMINOPHEN 1000 MG: 500 TABLET ORAL at 04:10

## 2024-04-03 ASSESSMENT — PAIN SCALES - GENERAL
PAINLEVEL_OUTOF10: 6
PAINLEVEL_OUTOF10: 6

## 2024-04-03 NOTE — PROGRESS NOTES
Patient not provided with a blood pressure cuff for discharge. Patient had a couple elevated blood pressures during stay however they were either during a contraction and patient was in pain or during the epidural procedure.

## 2024-04-03 NOTE — DISCHARGE SUMMARY
Discharge Summary    Rose Mary Momin  :  2005  MRN:  [id number]    ADMIT DATE:  2024  DISCHARGE DATE:  4/3/2024    PRIMARY CARE PHYSICIAN:  Jennifer Díaz APRN - CNP    VISIT STATUS: Admission    CODE STATUS:  Full Code    DISCHARGE DIAGNOSES:  Principal Problem:    Normal labor and delivery  Active Problems:    Term pregnancy    38 weeks gestation of pregnancy  Resolved Problems:    * No resolved hospital problems. *      HOSPITAL COURSE:  Patient presented in spontaneous labor at term. She was admitted for expectant management  and experienced SROM prior to obtaining epidural. Labor progressed without difficulty and she was delivered of viable male infant 2024 @ 1617 (38.5 weeks). Please refer to delivery notes for further details. She did receive a median episiotomy to help facilitate delivery. Postpartum course was uncomplicated and she achieved PP goals.  On PPD#2, patient was stable  and she was discharged home with instructions, medications and follow up with Dr. Baig in 2 weeks.   RECOMMENDED NEXT STEPS:   Discharge Home      DISCHARGE MEDICATIONS:         Medication List        START taking these medications      docusate 100 MG Caps  Commonly known as: COLACE, DULCOLAX  Take 100 mg by mouth daily     ferrous sulfate 325 (65 Fe) MG tablet  Commonly known as: IRON 325  Take 1 tablet by mouth 2 times daily (with meals) for 15 days     ibuprofen 800 MG tablet  Commonly known as: ADVIL;MOTRIN  Take 1 tablet by mouth every 8 hours for 10 days            CONTINUE taking these medications      fluticasone 50 MCG/ACT nasal spray  Commonly known as: FLONASE  1 spray by Each Nostril route in the morning and at bedtime for 14 days            ASK your doctor about these medications      acetaminophen 500 MG tablet  Commonly known as: TYLENOL  Take 2 tablets by mouth every 6 hours as needed for Pain     Prenatal Multi +DHA 27-0.8-200 MG Caps  Take 1 capsule by mouth daily     terconazole

## 2024-04-03 NOTE — PROGRESS NOTES
Offered patient tdap vaccine. Patient stated she would think about it and let nurse know prior to discharge.

## 2024-04-03 NOTE — PLAN OF CARE
Problem: Pain  Goal: Verbalizes/displays adequate comfort level or baseline comfort level  2024 0750 by Ciara Chavez RN  Outcome: Progressing  2024 by Trisha Dick RN  Outcome: Progressing  2024 by Juni Nuno RN  Outcome: Progressing     Problem: Postpartum  Goal: Experiences normal postpartum course  Description:  Postpartum OB-Pregnancy care plan goal which identifies if the mother is experiencing a normal postpartum course  2024 0750 by Ciara Chavez RN  Outcome: Progressing  2024 by Trisha Dick RN  Outcome: Progressing  Goal: Appropriate maternal -  bonding  Description:  Postpartum OB-Pregnancy care plan goal which identifies if the mother and  are bonding appropriately  2024 0750 by Ciara Chavez RN  Outcome: Progressing  2024 by Trisha Dick RN  Outcome: Progressing  Goal: Establishment of infant feeding pattern  Description:  Postpartum OB-Pregnancy care plan goal which identifies if the mother is establishing a feeding pattern with their   2024 0750 by Ciara Chavez RN  Outcome: Progressing  2024 by Trisha Dick RN  Outcome: Progressing  Goal: Incisions, wounds, or drain sites healing without S/S of infection  2024 075 by Ciara Chavez RN  Outcome: Progressing  2024 by Trisha Dick RN  Outcome: Progressing     Problem: Infection - Adult  Goal: Absence of infection at discharge  2024 0750 by Ciara Chavez RN  Outcome: Progressing  2024 by Trisha Dick RN  Outcome: Progressing  Goal: Absence of infection during hospitalization  2024 0750 by Ciara Chavez RN  Outcome: Progressing  2024 by Trisha Dick RN  Outcome: Progressing  Goal: Absence of fever/infection during anticipated neutropenic period  2024 0750 by Ciara Chavez RN  Outcome: Progressing  2024 by Trisha Dick RN  Outcome: Progressing     Problem: Safety - Adult  Goal: 
  Problem: Pain  Goal: Verbalizes/displays adequate comfort level or baseline comfort level  20249 by Trisha Dick, RN  Outcome: Progressing  2024 by Juni Nuno, RN  Outcome: Progressing     Problem: Postpartum  Goal: Experiences normal postpartum course  Description:  Postpartum OB-Pregnancy care plan goal which identifies if the mother is experiencing a normal postpartum course  Outcome: Progressing  Goal: Appropriate maternal -  bonding  Description:  Postpartum OB-Pregnancy care plan goal which identifies if the mother and  are bonding appropriately  Outcome: Progressing  Goal: Establishment of infant feeding pattern  Description:  Postpartum OB-Pregnancy care plan goal which identifies if the mother is establishing a feeding pattern with their   Outcome: Progressing  Goal: Incisions, wounds, or drain sites healing without S/S of infection  Outcome: Progressing     Problem: Infection - Adult  Goal: Absence of infection at discharge  Outcome: Progressing  Goal: Absence of infection during hospitalization  Outcome: Progressing  Goal: Absence of fever/infection during anticipated neutropenic period  Outcome: Progressing     Problem: Safety - Adult  Goal: Free from fall injury  Outcome: Progressing     Problem: Discharge Planning  Goal: Discharge to home or other facility with appropriate resources  Outcome: Progressing     
  Problem: Pain  Goal: Verbalizes/displays adequate comfort level or baseline comfort level  4/3/2024 0729 by Teresa Miller RN  Outcome: Progressing  2024 by Bridgett Bishop RN  Outcome: Progressing     Problem: Postpartum  Goal: Experiences normal postpartum course  Description:  Postpartum OB-Pregnancy care plan goal which identifies if the mother is experiencing a normal postpartum course  4/3/2024 0729 by Teresa Miller RN  Outcome: Progressing  2024 by Bridgett Bishop RN  Outcome: Progressing  Goal: Appropriate maternal -  bonding  Description:  Postpartum OB-Pregnancy care plan goal which identifies if the mother and  are bonding appropriately  4/3/2024 0729 by Teresa Miller RN  Outcome: Progressing  2024 by Bridgett Bishop RN  Outcome: Progressing  Goal: Establishment of infant feeding pattern  Description:  Postpartum OB-Pregnancy care plan goal which identifies if the mother is establishing a feeding pattern with their   4/3/2024 0729 by Teresa Miller RN  Outcome: Progressing  2024 by Bridgett Bishop RN  Outcome: Progressing  Goal: Incisions, wounds, or drain sites healing without S/S of infection  4/3/2024 0729 by Teresa Miller RN  Outcome: Progressing  2024 by Bridgett Bishop RN  Outcome: Progressing     Problem: Infection - Adult  Goal: Absence of infection at discharge  4/3/2024 0729 by Teresa Miller RN  Outcome: Progressing  2024 by Bridgett Bishop RN  Outcome: Progressing  Goal: Absence of infection during hospitalization  4/3/2024 0729 by Teresa Miller RN  Outcome: Progressing  2024 by Bridgett Bishop RN  Outcome: Progressing  Goal: Absence of fever/infection during anticipated neutropenic period  4/3/2024 0729 by Teresa Miller RN  Outcome: Progressing  2024 by Bridgett Bishop RN  Outcome: Progressing     Problem: Safety - Adult  Goal: Free from fall injury  4/3/2024 0729 by Teresa Miller 
  Problem: Pain  Goal: Verbalizes/displays adequate comfort level or baseline comfort level  Outcome: Progressing     
  Problem: Pain  Goal: Verbalizes/displays adequate comfort level or baseline comfort level  Outcome: Progressing     Problem: Postpartum  Goal: Experiences normal postpartum course  Description:  Postpartum OB-Pregnancy care plan goal which identifies if the mother is experiencing a normal postpartum course  Outcome: Progressing  Goal: Appropriate maternal -  bonding  Description:  Postpartum OB-Pregnancy care plan goal which identifies if the mother and  are bonding appropriately  Outcome: Progressing  Goal: Establishment of infant feeding pattern  Description:  Postpartum OB-Pregnancy care plan goal which identifies if the mother is establishing a feeding pattern with their   Outcome: Progressing  Goal: Incisions, wounds, or drain sites healing without S/S of infection  Outcome: Progressing     Problem: Infection - Adult  Goal: Absence of infection at discharge  Outcome: Progressing  Goal: Absence of infection during hospitalization  Outcome: Progressing  Goal: Absence of fever/infection during anticipated neutropenic period  Outcome: Progressing     Problem: Safety - Adult  Goal: Free from fall injury  Outcome: Progressing     Problem: Discharge Planning  Goal: Discharge to home or other facility with appropriate resources  Outcome: Progressing     
Completed  4/3/2024 0729 by Teresa Miller, RN  Outcome: Progressing     Problem: Discharge Planning  Goal: Discharge to home or other facility with appropriate resources  4/3/2024 1258 by Teresa Miller, RN  Outcome: Completed  4/3/2024 0729 by Teresa Miller, RN  Outcome: Progressing

## 2024-04-08 ENCOUNTER — CARE COORDINATION (OUTPATIENT)
Dept: OTHER | Facility: CLINIC | Age: 19
End: 2024-04-08

## 2024-04-08 NOTE — CARE COORDINATION
Ambulatory Care Coordination Note    ACM attempted to reach patient for introduction to Associate Care Management related to PP CM and follow-up. HIPAA compliant message left requesting a return phone call at patient convenience.     Plan for follow-up call in 7-10 days      Future Appointments   Date Time Provider Department Center   4/23/2024  4:30 PM Elisabet Campo DO [facility] ASHWINI Stringer, RN  Associate Care Manager   Cell: [phone]  [email or web address]

## 2024-04-23 ENCOUNTER — TELEMEDICINE (OUTPATIENT)
Dept: OBGYN CLINIC | Age: 19
End: 2024-04-23

## 2024-04-23 PROCEDURE — 99024 POSTOP FOLLOW-UP VISIT: CPT | Performed by: OBSTETRICS & GYNECOLOGY

## 2024-04-23 ASSESSMENT — ENCOUNTER SYMPTOMS
ABDOMINAL PAIN: 0
APNEA: 0
SHORTNESS OF BREATH: 0

## 2024-04-23 NOTE — PROGRESS NOTES
2024    TELEHEALTH EVALUATION -- Audio/Visual    HPI:    Rose Mary Momin (:  2005) has requested an audio/video evaluation for the following concern(s):    Routine 2w postpartum follow up    Review of Systems   Constitutional:  Negative for fatigue and fever.   Respiratory:  Negative for apnea and shortness of breath.    Cardiovascular:  Negative for chest pain and palpitations.   Gastrointestinal:  Negative for abdominal pain.   Genitourinary:  Positive for vaginal bleeding. Negative for difficulty urinating, dysuria, pelvic pain and vaginal discharge.   Neurological:  Negative for dizziness, weakness, light-headedness and headaches.   Psychiatric/Behavioral:  Negative for agitation, dysphoric mood, sleep disturbance and suicidal ideas. The patient is not nervous/anxious.        Prior to Visit Medications    Medication Sig Taking? Authorizing Provider   docusate sodium (COLACE, DULCOLAX) 100 MG CAPS Take 100 mg by mouth daily  Ciaran Mendiola MD   ferrous sulfate (IRON 325) 325 (65 Fe) MG tablet Take 1 tablet by mouth 2 times daily (with meals) for 15 days  Ciaran Mendiola MD   ibuprofen (ADVIL;MOTRIN) 800 MG tablet Take 1 tablet by mouth every 8 hours for 10 days  Ciaran Mendiola MD   acetaminophen (TYLENOL) 500 MG tablet Take 2 tablets by mouth every 6 hours as needed for Pain  Patient not taking: Reported on 2024  Caroline Wallace APRN - NP   terconazole (TERAZOL 7) 0.4 % vaginal cream Place vaginally nightly.  Patient not taking: Reported on 3/27/2024  Elisabet Campo DO   Prenatal MV-Min-Fe Fum-FA-DHA (PRENATAL MULTI +DHA) 27-0.8-200 MG CAPS Take 1 capsule by mouth daily  Patient not taking: Reported on 2024  Jennifer Díaz APRN - CNP   fluticasone (FLONASE) 50 MCG/ACT nasal spray 1 spray by Each Nostril route in the morning and at bedtime for 14 days  Brooke Castanon APRN - CNP       Social History     Tobacco Use    Smoking status: Never    Smokeless

## 2024-05-01 ENCOUNTER — CARE COORDINATION (OUTPATIENT)
Dept: OTHER | Facility: CLINIC | Age: 19
End: 2024-05-01

## 2024-05-01 NOTE — CARE COORDINATION
ACM contacted patient for PP follow-up. She states she and baby are doing well. She has no concerns and has been seen virtually for mood check. She will be seen at 6 weeks for in-person visit and her son has been seen by Peds twice and will be seen again tomorrow. She declines CM r/t no CM needs at this time. No further outreach scheduled with this ACM, ACM will sign off care team at this time. Program status updated to reflect closed.  Patient  has this ACM's contact information if future needs arise.      ASHWINI Preciado, RN  Associate Care Manager   Cell: [phone]  [email or web address]

## 2024-05-18 ENCOUNTER — PATIENT MESSAGE (OUTPATIENT)
Dept: OBGYN CLINIC | Age: 19
End: 2024-05-18

## 2024-05-20 NOTE — TELEPHONE ENCOUNTER
From: Rose Mary Momin  To: Dr. Elisabet Campo  Sent: 5/18/2024 7:30 AM EDT  Subject: Birth control    Hello, I'm reaching out to see if on my appointment this Tuesday may 21st, if I'll be getting my IUD placed or if that will be a different appointment?

## 2024-05-21 ENCOUNTER — OFFICE VISIT (OUTPATIENT)
Dept: OBGYN CLINIC | Age: 19
End: 2024-05-21

## 2024-05-21 VITALS
DIASTOLIC BLOOD PRESSURE: 80 MMHG | SYSTOLIC BLOOD PRESSURE: 124 MMHG | WEIGHT: 197 LBS | HEIGHT: 64 IN | BODY MASS INDEX: 33.63 KG/M2

## 2024-05-21 DIAGNOSIS — E04.9 ENLARGED THYROID: Primary | ICD-10-CM

## 2024-05-21 PROCEDURE — 0503F POSTPARTUM CARE VISIT: CPT | Performed by: OBSTETRICS & GYNECOLOGY

## 2024-05-22 NOTE — PROGRESS NOTES
SUBJECTIVE:   18 y.o.  here for post partum exam. Pt bottle feeding without difficulty. Pt with irregular VB since delivery and no complaints. Thinks she is having first menses now. Bottle feeding    Review of Systems:  General ROS: negative  Psychological ROS: negative  ENT ROS: negative  Endocrine ROS: negative  Respiratory ROS: no cough, shortness of breath, or wheezing  Cardiovascular ROS: no chest pain or dyspnea on exertion  Gastrointestinal ROS: no abdominal pain, change in bowel habits, or black or bloody stools  Genito-Urinary ROS: no dysuria, trouble voiding, or hematuria  Musculoskeletal ROS: negative  Neurological ROS: no TIA or stroke symptoms  Dermatological ROS: negative    OBJECTIVE:   /80   Ht 1.626 m (5' 4\")   Wt 89.4 kg (197 lb)   LMP 2023   Breastfeeding No   BMI 33.81 kg/m²     Physical Exam:  GEN: She appears well, afebrile.   HEENT: normal cephalic, atraumatic  CVS: regular rate and rhythm  ABDOMEN: benign, soft, nontender, no masses.  MUSCULOSKELETAL: normal gait, no masses  SKIN: normal texture and tone, no lesions  NEURO: normal tone, no hyperreflexia, 1+DTRs throughout    Pelvic Exam:   deferred    ASSESSMENT:   Post partum care and exam    PLAN:   LPS  Past medical, social and family history reviewed and updated in pt's chart.   Post partum care reviewed with patient. Pt adjusting well to infant and denies any depressions sx. Risks, benefits and alternative therapies for metrorrhagia discussed. Pt desires IUD it is ordered

## 2024-06-21 ENCOUNTER — PATIENT MESSAGE (OUTPATIENT)
Dept: OBGYN CLINIC | Age: 19
End: 2024-06-21

## 2024-06-21 NOTE — TELEPHONE ENCOUNTER
From: Rose Mary Morel  To: Dr. Elisabet Campo  Sent: 6/21/2024 12:25 PM EDT  Subject: IUD    Amarjit, this is Rose Mary morel regarding my IUD I just spoke with [facility] specialty pharmacy and they have my IUD being delivered Friday June 28th. Do I just call for an appointment or wait until after they deliver the device?

## 2024-07-31 ENCOUNTER — PROCEDURE VISIT (OUTPATIENT)
Dept: OBGYN CLINIC | Age: 19
End: 2024-07-31
Payer: COMMERCIAL

## 2024-07-31 VITALS
SYSTOLIC BLOOD PRESSURE: 118 MMHG | HEIGHT: 64 IN | BODY MASS INDEX: 35.17 KG/M2 | WEIGHT: 206 LBS | DIASTOLIC BLOOD PRESSURE: 74 MMHG

## 2024-07-31 DIAGNOSIS — Z30.430 ENCOUNTER FOR IUD INSERTION: Primary | ICD-10-CM

## 2024-07-31 DIAGNOSIS — Z32.02 URINE PREGNANCY TEST NEGATIVE: ICD-10-CM

## 2024-07-31 DIAGNOSIS — N92.6 IRREGULAR MENSES: ICD-10-CM

## 2024-07-31 PROCEDURE — 81025 URINE PREGNANCY TEST: CPT | Performed by: OBSTETRICS & GYNECOLOGY

## 2024-07-31 PROCEDURE — 58300 INSERT INTRAUTERINE DEVICE: CPT | Performed by: OBSTETRICS & GYNECOLOGY

## 2024-07-31 SDOH — ECONOMIC STABILITY: FOOD INSECURITY: WITHIN THE PAST 12 MONTHS, YOU WORRIED THAT YOUR FOOD WOULD RUN OUT BEFORE YOU GOT MONEY TO BUY MORE.: NEVER TRUE

## 2024-07-31 SDOH — ECONOMIC STABILITY: FOOD INSECURITY: WITHIN THE PAST 12 MONTHS, THE FOOD YOU BOUGHT JUST DIDN'T LAST AND YOU DIDN'T HAVE MONEY TO GET MORE.: NEVER TRUE

## 2024-07-31 SDOH — ECONOMIC STABILITY: INCOME INSECURITY: HOW HARD IS IT FOR YOU TO PAY FOR THE VERY BASICS LIKE FOOD, HOUSING, MEDICAL CARE, AND HEATING?: NOT HARD AT ALL

## 2024-08-01 NOTE — PROGRESS NOTES
IUD Insertion Procedure Note    Pre-operative Diagnosis: dub    Post-operative Diagnosis: same    Indications: undesired fertility    Procedure Details   Urine pregnancy test was done 7/31 and result was neg.  The risks (including infection, bleeding, pain, and uterine perforation) and benefits of the procedure were explained to the patient and Written informed consent was obtained.      Cervix cleansed with Betadine. Uterus sounded to 9 cm. IUD inserted without difficulty. String visible and trimmed. Patient tolerated procedure well.    IUD Information:  ga.    Condition:  Stable    Complications:  None    Plan:    The patient was advised to call for any fever or for prolonged or severe pain or bleeding. She was advised to use NSAID as needed for mild to moderate pain.     Follow up:  No follow-ups on file.      Elisabet Baig DO

## 2024-08-06 LAB
HCG, URINE, POC: NEGATIVE
Lab: NORMAL
NEGATIVE QC PASS/FAIL: NORMAL
POSITIVE QC PASS/FAIL: NORMAL

## 2024-10-22 ENCOUNTER — OFFICE VISIT (OUTPATIENT)
Dept: FAMILY MEDICINE CLINIC | Age: 19
End: 2024-10-22

## 2024-10-22 VITALS
WEIGHT: 212 LBS | RESPIRATION RATE: 16 BRPM | OXYGEN SATURATION: 99 % | SYSTOLIC BLOOD PRESSURE: 118 MMHG | HEIGHT: 64 IN | HEART RATE: 104 BPM | BODY MASS INDEX: 36.19 KG/M2 | DIASTOLIC BLOOD PRESSURE: 86 MMHG | TEMPERATURE: 97.7 F

## 2024-10-22 DIAGNOSIS — Z00.00 ANNUAL PHYSICAL EXAM: Primary | ICD-10-CM

## 2024-10-22 DIAGNOSIS — Z23 NEED FOR INFLUENZA VACCINATION: ICD-10-CM

## 2024-10-22 ASSESSMENT — ENCOUNTER SYMPTOMS
SORE THROAT: 0
TROUBLE SWALLOWING: 0
BLOOD IN STOOL: 0
EYES NEGATIVE: 1
COUGH: 0
CONSTIPATION: 0
NAUSEA: 0
WHEEZING: 0
DIARRHEA: 0
VOMITING: 0
SHORTNESS OF BREATH: 0
CHEST TIGHTNESS: 0
ABDOMINAL PAIN: 0

## 2024-10-22 NOTE — PROGRESS NOTES
Rose Mary Momin (:  2005) is a 18 y.o. female, Established patient, here for evaluation of the following chief complaint(s):  Annual Exam (Patient is here for an annual physical exam. 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 )          Subjective   History of Present Illness  The patient presents for a school physical.    She is here for a school physical examination. She reports no difficulties with bending, standing, sitting, or lifting. She has no other health concerns at this time.    No past medical history on file.  Past Surgical History:   Procedure Laterality Date    HERNIA REPAIR      Age: 2     Social History     Socioeconomic History    Marital status: Single     Spouse name: Not on file    Number of children: Not on file    Years of education: Not on file    Highest education level: Not on file   Occupational History    Not on file   Tobacco Use    Smoking status: Never    Smokeless tobacco: Never   Substance and Sexual Activity    Alcohol use: No    Drug use: No    Sexual activity: Never   Other Topics Concern    Not on file   Social History Narrative    Not on file     Social Determinants of Health     Financial Resource Strain: Low Risk  (2024)    Overall Financial Resource Strain (CARDIA)     Difficulty of Paying Living Expenses: Not hard at all   Food Insecurity: No Food Insecurity (2024)    Hunger Vital Sign     Worried About Running Out of Food in the Last Year: Never true     Ran Out of Food in the Last Year: Never true   Transportation Needs: No Transportation Needs (2024)    PRAPARE - Transportation     Lack of Transportation (Medical): No     Lack of Transportation (Non-Medical): No   Physical Activity: Not on file   Stress: Not on file   Social Connections: Not on file   Intimate Partner Violence: Not on file   Housing Stability: Low Risk  (2024)    Housing Stability Vital Sign     Unable to Pay for Housing in the

## 2024-10-24 ENCOUNTER — NURSE ONLY (OUTPATIENT)
Dept: FAMILY MEDICINE CLINIC | Age: 19
End: 2024-10-24

## 2024-10-24 LAB
INDURATION: NORMAL
TB SKIN TEST: NEGATIVE

## 2024-11-05 ENCOUNTER — LAB (OUTPATIENT)
Dept: FAMILY MEDICINE CLINIC | Age: 19
End: 2024-11-05
Payer: COMMERCIAL

## 2024-11-05 DIAGNOSIS — Z23 NEED FOR TUBERCULOSIS VACCINATION: Primary | ICD-10-CM

## 2024-11-05 PROCEDURE — 86580 TB INTRADERMAL TEST: CPT | Performed by: NURSE PRACTITIONER

## 2024-11-05 NOTE — PROGRESS NOTES
Tuberculin skin test applied to right ventral forearm. Explained how to read the test, measuring induration not just erythema; she will come into office if test appears positive. Pt aware to come in for reading in 48-72 hours.

## 2024-11-08 ENCOUNTER — NURSE ONLY (OUTPATIENT)
Dept: PRIMARY CARE CLINIC | Age: 19
End: 2024-11-08